# Patient Record
Sex: FEMALE | Race: WHITE
[De-identification: names, ages, dates, MRNs, and addresses within clinical notes are randomized per-mention and may not be internally consistent; named-entity substitution may affect disease eponyms.]

---

## 2020-01-03 ENCOUNTER — HOSPITAL ENCOUNTER (OUTPATIENT)
Dept: HOSPITAL 8 - STAR | Age: 80
Discharge: HOME | End: 2020-01-03
Attending: OBSTETRICS & GYNECOLOGY
Payer: MEDICARE

## 2020-01-03 DIAGNOSIS — Z90.710: ICD-10-CM

## 2020-01-03 DIAGNOSIS — Z98.890: ICD-10-CM

## 2020-01-03 DIAGNOSIS — N81.4: Primary | ICD-10-CM

## 2020-01-03 DIAGNOSIS — I10: ICD-10-CM

## 2020-01-03 DIAGNOSIS — N81.6: ICD-10-CM

## 2020-01-03 DIAGNOSIS — N39.3: ICD-10-CM

## 2020-01-03 LAB
ALBUMIN SERPL-MCNC: 3.6 G/DL (ref 3.4–5)
ALP SERPL-CCNC: 45 U/L (ref 45–117)
ALT SERPL-CCNC: 26 U/L (ref 12–78)
ANION GAP SERPL CALC-SCNC: 6 MMOL/L (ref 5–15)
BASOPHILS # BLD AUTO: 0.05 X10^3/UL (ref 0–0.1)
BASOPHILS NFR BLD AUTO: 1 % (ref 0–1)
BILIRUB SERPL-MCNC: 0.3 MG/DL (ref 0.2–1)
CALCIUM SERPL-MCNC: 9 MG/DL (ref 8.5–10.1)
CHLORIDE SERPL-SCNC: 108 MMOL/L (ref 98–107)
CREAT SERPL-MCNC: 1.19 MG/DL (ref 0.55–1.02)
CULTURE INDICATED?: YES
EOSINOPHIL # BLD AUTO: 0 X10^3/UL (ref 0–0.4)
EOSINOPHIL NFR BLD AUTO: 0 % (ref 1–7)
ERYTHROCYTE [DISTWIDTH] IN BLOOD BY AUTOMATED COUNT: 12.7 % (ref 9.6–15.2)
LYMPHOCYTES # BLD AUTO: 2.59 X10^3/UL (ref 1–3.4)
LYMPHOCYTES NFR BLD AUTO: 28 % (ref 22–44)
MCH RBC QN AUTO: 32 PG (ref 27–34.8)
MCHC RBC AUTO-ENTMCNC: 32.9 G/DL (ref 32.4–35.8)
MCV RBC AUTO: 97.3 FL (ref 80–100)
MD: NO
MICROSCOPIC: (no result)
MONOCYTES # BLD AUTO: 1.07 X10^3/UL (ref 0.2–0.8)
MONOCYTES NFR BLD AUTO: 12 % (ref 2–9)
NEUTROPHILS # BLD AUTO: 5.49 X10^3/UL (ref 1.8–6.8)
NEUTROPHILS NFR BLD AUTO: 60 % (ref 42–75)
PLATELET # BLD AUTO: 316 X10^3/UL (ref 130–400)
PMV BLD AUTO: 7.4 FL (ref 7.4–10.4)
PROT SERPL-MCNC: 7.2 G/DL (ref 6.4–8.2)
RBC # BLD AUTO: 4.12 X10^6/UL (ref 3.82–5.3)

## 2020-01-03 PROCEDURE — 93005 ELECTROCARDIOGRAM TRACING: CPT

## 2020-01-03 PROCEDURE — 36415 COLL VENOUS BLD VENIPUNCTURE: CPT

## 2020-01-03 PROCEDURE — 87077 CULTURE AEROBIC IDENTIFY: CPT

## 2020-01-03 PROCEDURE — 71046 X-RAY EXAM CHEST 2 VIEWS: CPT

## 2020-01-03 PROCEDURE — 87186 SC STD MICRODIL/AGAR DIL: CPT

## 2020-01-03 PROCEDURE — 87086 URINE CULTURE/COLONY COUNT: CPT

## 2020-01-03 PROCEDURE — 85025 COMPLETE CBC W/AUTO DIFF WBC: CPT

## 2020-01-03 PROCEDURE — 81001 URINALYSIS AUTO W/SCOPE: CPT

## 2020-01-03 PROCEDURE — 80053 COMPREHEN METABOLIC PANEL: CPT

## 2020-01-10 ENCOUNTER — HOSPITAL ENCOUNTER (OUTPATIENT)
Dept: HOSPITAL 8 - OUT | Age: 80
Setting detail: OBSERVATION
LOS: 1 days | Discharge: HOME | End: 2020-01-11
Attending: OBSTETRICS & GYNECOLOGY | Admitting: OBSTETRICS & GYNECOLOGY
Payer: MEDICARE

## 2020-01-10 VITALS — DIASTOLIC BLOOD PRESSURE: 63 MMHG | SYSTOLIC BLOOD PRESSURE: 130 MMHG

## 2020-01-10 VITALS — WEIGHT: 99.21 LBS | HEIGHT: 62 IN | BODY MASS INDEX: 18.26 KG/M2

## 2020-01-10 VITALS — SYSTOLIC BLOOD PRESSURE: 144 MMHG | DIASTOLIC BLOOD PRESSURE: 81 MMHG

## 2020-01-10 VITALS — DIASTOLIC BLOOD PRESSURE: 48 MMHG | SYSTOLIC BLOOD PRESSURE: 100 MMHG

## 2020-01-10 DIAGNOSIS — Z79.899: ICD-10-CM

## 2020-01-10 DIAGNOSIS — I10: ICD-10-CM

## 2020-01-10 DIAGNOSIS — N81.4: Primary | ICD-10-CM

## 2020-01-10 DIAGNOSIS — N39.3: ICD-10-CM

## 2020-01-10 PROCEDURE — 85014 HEMATOCRIT: CPT

## 2020-01-10 PROCEDURE — 88307 TISSUE EXAM BY PATHOLOGIST: CPT

## 2020-01-10 PROCEDURE — 57265 CMBN AP COLPRHY W/NTRCL RPR: CPT

## 2020-01-10 PROCEDURE — C1771 REP DEV, URINARY, W/SLING: HCPCS

## 2020-01-10 PROCEDURE — G0378 HOSPITAL OBSERVATION PER HR: HCPCS

## 2020-01-10 PROCEDURE — C1760 CLOSURE DEV, VASC: HCPCS

## 2020-01-10 PROCEDURE — 51990 LAPARO URETHRAL SUSPENSION: CPT

## 2020-01-10 PROCEDURE — 36415 COLL VENOUS BLD VENIPUNCTURE: CPT

## 2020-01-10 PROCEDURE — 58550 LAPARO-ASST VAG HYSTERECTOMY: CPT

## 2020-01-10 RX ADMIN — LABETALOL HYDROCHLORIDE PRN MG: 5 INJECTION, SOLUTION INTRAVENOUS at 14:45

## 2020-01-10 RX ADMIN — LABETALOL HYDROCHLORIDE PRN MG: 5 INJECTION, SOLUTION INTRAVENOUS at 14:35

## 2020-01-10 RX ADMIN — SODIUM CHLORIDE, SODIUM LACTATE, POTASSIUM CHLORIDE, AND CALCIUM CHLORIDE SCH MLS/HR: .6; .31; .03; .02 INJECTION, SOLUTION INTRAVENOUS at 22:19

## 2020-01-10 RX ADMIN — PROMETHAZINE HYDROCHLORIDE PRN MG: 25 INJECTION INTRAMUSCULAR; INTRAVENOUS at 15:20

## 2020-01-10 RX ADMIN — PROMETHAZINE HYDROCHLORIDE PRN MG: 25 INJECTION INTRAMUSCULAR; INTRAVENOUS at 15:30

## 2020-01-10 RX ADMIN — SODIUM CHLORIDE, SODIUM LACTATE, POTASSIUM CHLORIDE, AND CALCIUM CHLORIDE SCH MLS/HR: .6; .31; .03; .02 INJECTION, SOLUTION INTRAVENOUS at 17:00

## 2020-01-11 VITALS — SYSTOLIC BLOOD PRESSURE: 107 MMHG | DIASTOLIC BLOOD PRESSURE: 59 MMHG

## 2020-01-11 VITALS — DIASTOLIC BLOOD PRESSURE: 58 MMHG | SYSTOLIC BLOOD PRESSURE: 123 MMHG

## 2020-01-11 VITALS — DIASTOLIC BLOOD PRESSURE: 46 MMHG | SYSTOLIC BLOOD PRESSURE: 102 MMHG

## 2020-01-11 VITALS — SYSTOLIC BLOOD PRESSURE: 90 MMHG | DIASTOLIC BLOOD PRESSURE: 48 MMHG

## 2020-01-11 RX ADMIN — SODIUM CHLORIDE, SODIUM LACTATE, POTASSIUM CHLORIDE, AND CALCIUM CHLORIDE SCH MLS/HR: .6; .31; .03; .02 INJECTION, SOLUTION INTRAVENOUS at 05:46

## 2021-01-14 DIAGNOSIS — Z23 NEED FOR VACCINATION: ICD-10-CM

## 2022-02-22 ENCOUNTER — HOSPITAL ENCOUNTER (INPATIENT)
Facility: MEDICAL CENTER | Age: 82
LOS: 3 days | DRG: 175 | End: 2022-02-25
Attending: EMERGENCY MEDICINE | Admitting: INTERNAL MEDICINE
Payer: MEDICARE

## 2022-02-22 ENCOUNTER — APPOINTMENT (OUTPATIENT)
Dept: RADIOLOGY | Facility: MEDICAL CENTER | Age: 82
DRG: 175 | End: 2022-02-22
Attending: EMERGENCY MEDICINE
Payer: MEDICARE

## 2022-02-22 ENCOUNTER — APPOINTMENT (OUTPATIENT)
Dept: RADIOLOGY | Facility: MEDICAL CENTER | Age: 82
DRG: 175 | End: 2022-02-22
Attending: STUDENT IN AN ORGANIZED HEALTH CARE EDUCATION/TRAINING PROGRAM
Payer: MEDICARE

## 2022-02-22 ENCOUNTER — APPOINTMENT (OUTPATIENT)
Dept: CARDIOLOGY | Facility: MEDICAL CENTER | Age: 82
DRG: 175 | End: 2022-02-22
Attending: STUDENT IN AN ORGANIZED HEALTH CARE EDUCATION/TRAINING PROGRAM
Payer: MEDICARE

## 2022-02-22 DIAGNOSIS — E43 SEVERE PROTEIN-CALORIE MALNUTRITION (HCC): ICD-10-CM

## 2022-02-22 DIAGNOSIS — T45.515A ADVERSE EFFECT OF ANTICOAGULANT, INITIAL ENCOUNTER: ICD-10-CM

## 2022-02-22 DIAGNOSIS — R09.02 HYPOXIA: ICD-10-CM

## 2022-02-22 PROBLEM — E46 MALNUTRITION (HCC): Status: ACTIVE | Noted: 2022-02-22

## 2022-02-22 PROBLEM — R10.9 AP (ABDOMINAL PAIN): Status: ACTIVE | Noted: 2022-02-22

## 2022-02-22 PROBLEM — I26.99 PULMONARY EMBOLISM (HCC): Status: ACTIVE | Noted: 2022-02-22

## 2022-02-22 PROBLEM — R79.89 ELEVATED TROPONIN: Status: ACTIVE | Noted: 2022-02-22

## 2022-02-22 PROBLEM — N17.9 ACUTE KIDNEY FAILURE (HCC): Status: ACTIVE | Noted: 2022-02-22

## 2022-02-22 PROBLEM — C80.1 CANCER (HCC): Status: ACTIVE | Noted: 2022-02-22

## 2022-02-22 PROBLEM — E87.20 METABOLIC ACIDOSIS WITH NORMAL ANION GAP AND BICARBONATE LOSSES: Status: ACTIVE | Noted: 2022-02-22

## 2022-02-22 PROBLEM — J96.01 ACUTE RESPIRATORY FAILURE WITH HYPOXIA (HCC): Status: ACTIVE | Noted: 2022-02-22

## 2022-02-22 LAB
25(OH)D3 SERPL-MCNC: 29 NG/ML (ref 30–100)
ACETONE UR QL: ABNORMAL
ALBUMIN SERPL BCP-MCNC: 3.9 G/DL (ref 3.2–4.9)
ALBUMIN/GLOB SERPL: 1.3 G/DL
ALP SERPL-CCNC: 59 U/L (ref 30–99)
ALT SERPL-CCNC: 5 U/L (ref 2–50)
ANION GAP SERPL CALC-SCNC: 11 MMOL/L (ref 7–16)
APPEARANCE UR: ABNORMAL
AST SERPL-CCNC: 11 U/L (ref 12–45)
BACTERIA #/AREA URNS HPF: NEGATIVE /HPF
BASE EXCESS BLDA CALC-SCNC: -11 MMOL/L (ref -4–3)
BASOPHILS # BLD AUTO: 0.2 % (ref 0–1.8)
BASOPHILS # BLD: 0.03 K/UL (ref 0–0.12)
BILIRUB SERPL-MCNC: 0.3 MG/DL (ref 0.1–1.5)
BILIRUB UR QL STRIP.AUTO: ABNORMAL
BODY TEMPERATURE: ABNORMAL CENTIGRADE
BUN SERPL-MCNC: 25 MG/DL (ref 8–22)
CALCIUM SERPL-MCNC: 9.9 MG/DL (ref 8.5–10.5)
CHLORIDE SERPL-SCNC: 108 MMOL/L (ref 96–112)
CHLORIDE UR-SCNC: 100 MMOL/L
CO2 SERPL-SCNC: 13 MMOL/L (ref 20–33)
COLOR UR: ABNORMAL
CREAT SERPL-MCNC: 1.46 MG/DL (ref 0.5–1.4)
CREAT UR-MCNC: 230 MG/DL
EKG IMPRESSION: NORMAL
EOSINOPHIL # BLD AUTO: 0 K/UL (ref 0–0.51)
EOSINOPHIL NFR BLD: 0 % (ref 0–6.9)
EPI CELLS #/AREA URNS HPF: ABNORMAL /HPF
ERYTHROCYTE [DISTWIDTH] IN BLOOD BY AUTOMATED COUNT: 57.1 FL (ref 35.9–50)
FLUAV RNA SPEC QL NAA+PROBE: NEGATIVE
FLUBV RNA SPEC QL NAA+PROBE: NEGATIVE
GLOBULIN SER CALC-MCNC: 3.1 G/DL (ref 1.9–3.5)
GLUCOSE SERPL-MCNC: 117 MG/DL (ref 65–99)
GLUCOSE UR STRIP.AUTO-MCNC: NEGATIVE MG/DL
GRAN CASTS #/AREA URNS LPF: ABNORMAL /LPF
HCO3 BLDA-SCNC: 13 MMOL/L (ref 17–25)
HCT VFR BLD AUTO: 33.5 % (ref 37–47)
HEMOCCULT SP1 STL QL: NEGATIVE
HGB BLD-MCNC: 10.8 G/DL (ref 12–16)
HYALINE CASTS #/AREA URNS LPF: ABNORMAL /LPF
IMM GRANULOCYTES # BLD AUTO: 0.07 K/UL (ref 0–0.11)
IMM GRANULOCYTES NFR BLD AUTO: 0.5 % (ref 0–0.9)
KETONES UR STRIP.AUTO-MCNC: ABNORMAL MG/DL
LACTATE BLD-SCNC: 1 MMOL/L (ref 0.5–2)
LEUKOCYTE ESTERASE UR QL STRIP.AUTO: ABNORMAL
LYMPHOCYTES # BLD AUTO: 1.65 K/UL (ref 1–4.8)
LYMPHOCYTES NFR BLD: 12.9 % (ref 22–41)
MAGNESIUM SERPL-MCNC: 1.7 MG/DL (ref 1.5–2.5)
MCH RBC QN AUTO: 29.8 PG (ref 27–33)
MCHC RBC AUTO-ENTMCNC: 32.2 G/DL (ref 33.6–35)
MCV RBC AUTO: 92.3 FL (ref 81.4–97.8)
MICRO URNS: ABNORMAL
MONOCYTES # BLD AUTO: 1.35 K/UL (ref 0–0.85)
MONOCYTES NFR BLD AUTO: 10.5 % (ref 0–13.4)
NEUTROPHILS # BLD AUTO: 9.71 K/UL (ref 2–7.15)
NEUTROPHILS NFR BLD: 75.9 % (ref 44–72)
NITRITE UR QL STRIP.AUTO: NEGATIVE
NRBC # BLD AUTO: 0 K/UL
NRBC BLD-RTO: 0 /100 WBC
NT-PROBNP SERPL IA-MCNC: 741 PG/ML (ref 0–125)
OSMOLALITY UR: 608 MOSM/KG H2O (ref 300–900)
PCO2 BLDA: 25.6 MMHG (ref 26–37)
PH BLDA: 7.33 [PH] (ref 7.4–7.5)
PH UR STRIP.AUTO: 5 [PH] (ref 5–8)
PHOSPHATE SERPL-MCNC: 3.2 MG/DL (ref 2.5–4.5)
PLATELET # BLD AUTO: 469 K/UL (ref 164–446)
PMV BLD AUTO: 8.2 FL (ref 9–12.9)
PO2 BLDA: 206.4 MMHG (ref 64–87)
POTASSIUM SERPL-SCNC: 5 MMOL/L (ref 3.6–5.5)
POTASSIUM UR-SCNC: 75 MMOL/L
PREALB SERPL-MCNC: 27.9 MG/DL (ref 18–38)
PROCALCITONIN SERPL-MCNC: 0.16 NG/ML
PROT SERPL-MCNC: 7 G/DL (ref 6–8.2)
PROT UR QL STRIP: 30 MG/DL
PROT UR-MCNC: 68 MG/DL (ref 0–15)
RBC # BLD AUTO: 3.63 M/UL (ref 4.2–5.4)
RBC # URNS HPF: ABNORMAL /HPF
RBC UR QL AUTO: ABNORMAL
RSV RNA SPEC QL NAA+PROBE: NEGATIVE
SAO2 % BLDA: 99.3 % (ref 93–99)
SARS-COV-2 RNA RESP QL NAA+PROBE: NOTDETECTED
SODIUM SERPL-SCNC: 132 MMOL/L (ref 135–145)
SODIUM UR-SCNC: 30 MMOL/L
SP GR UR STRIP.AUTO: 1.03
SPECIMEN SOURCE: NORMAL
TROPONIN T SERPL-MCNC: 21 NG/L (ref 6–19)
TROPONIN T SERPL-MCNC: 23 NG/L (ref 6–19)
TSH SERPL DL<=0.005 MIU/L-ACNC: 1.99 UIU/ML (ref 0.38–5.33)
UROBILINOGEN UR STRIP.AUTO-MCNC: 0.2 MG/DL
VIT B12 SERPL-MCNC: 634 PG/ML (ref 211–911)
WBC # BLD AUTO: 12.8 K/UL (ref 4.8–10.8)
WBC #/AREA URNS HPF: ABNORMAL /HPF
YEAST HYPHAE #/AREA URNS HPF: PRESENT /HPF

## 2022-02-22 PROCEDURE — 93005 ELECTROCARDIOGRAM TRACING: CPT

## 2022-02-22 PROCEDURE — 83880 ASSAY OF NATRIURETIC PEPTIDE: CPT

## 2022-02-22 PROCEDURE — 87493 C DIFF AMPLIFIED PROBE: CPT

## 2022-02-22 PROCEDURE — 700105 HCHG RX REV CODE 258: Performed by: STUDENT IN AN ORGANIZED HEALTH CARE EDUCATION/TRAINING PROGRAM

## 2022-02-22 PROCEDURE — 71275 CT ANGIOGRAPHY CHEST: CPT | Mod: ME

## 2022-02-22 PROCEDURE — 84156 ASSAY OF PROTEIN URINE: CPT

## 2022-02-22 PROCEDURE — 81002 URINALYSIS NONAUTO W/O SCOPE: CPT

## 2022-02-22 PROCEDURE — A9270 NON-COVERED ITEM OR SERVICE: HCPCS | Performed by: INTERNAL MEDICINE

## 2022-02-22 PROCEDURE — 83935 ASSAY OF URINE OSMOLALITY: CPT

## 2022-02-22 PROCEDURE — 82607 VITAMIN B-12: CPT

## 2022-02-22 PROCEDURE — 83630 LACTOFERRIN FECAL (QUAL): CPT

## 2022-02-22 PROCEDURE — 84134 ASSAY OF PREALBUMIN: CPT

## 2022-02-22 PROCEDURE — 0241U HCHG SARS-COV-2 COVID-19 NFCT DS RESP RNA 4 TRGT MIC: CPT

## 2022-02-22 PROCEDURE — 82103 ALPHA-1-ANTITRYPSIN TOTAL: CPT

## 2022-02-22 PROCEDURE — 74177 CT ABD & PELVIS W/CONTRAST: CPT | Mod: ME

## 2022-02-22 PROCEDURE — 99223 1ST HOSP IP/OBS HIGH 75: CPT | Performed by: INTERNAL MEDICINE

## 2022-02-22 PROCEDURE — 84145 PROCALCITONIN (PCT): CPT

## 2022-02-22 PROCEDURE — 93005 ELECTROCARDIOGRAM TRACING: CPT | Performed by: EMERGENCY MEDICINE

## 2022-02-22 PROCEDURE — 82803 BLOOD GASES ANY COMBINATION: CPT

## 2022-02-22 PROCEDURE — 82306 VITAMIN D 25 HYDROXY: CPT

## 2022-02-22 PROCEDURE — 84300 ASSAY OF URINE SODIUM: CPT

## 2022-02-22 PROCEDURE — 87040 BLOOD CULTURE FOR BACTERIA: CPT | Mod: 91

## 2022-02-22 PROCEDURE — 82570 ASSAY OF URINE CREATININE: CPT

## 2022-02-22 PROCEDURE — 82436 ASSAY OF URINE CHLORIDE: CPT

## 2022-02-22 PROCEDURE — 700117 HCHG RX CONTRAST REV CODE 255: Performed by: STUDENT IN AN ORGANIZED HEALTH CARE EDUCATION/TRAINING PROGRAM

## 2022-02-22 PROCEDURE — 700102 HCHG RX REV CODE 250 W/ 637 OVERRIDE(OP): Performed by: STUDENT IN AN ORGANIZED HEALTH CARE EDUCATION/TRAINING PROGRAM

## 2022-02-22 PROCEDURE — 36415 COLL VENOUS BLD VENIPUNCTURE: CPT

## 2022-02-22 PROCEDURE — 83735 ASSAY OF MAGNESIUM: CPT

## 2022-02-22 PROCEDURE — 71045 X-RAY EXAM CHEST 1 VIEW: CPT

## 2022-02-22 PROCEDURE — C9803 HOPD COVID-19 SPEC COLLECT: HCPCS | Performed by: STUDENT IN AN ORGANIZED HEALTH CARE EDUCATION/TRAINING PROGRAM

## 2022-02-22 PROCEDURE — 99285 EMERGENCY DEPT VISIT HI MDM: CPT

## 2022-02-22 PROCEDURE — 80053 COMPREHEN METABOLIC PANEL: CPT

## 2022-02-22 PROCEDURE — A9270 NON-COVERED ITEM OR SERVICE: HCPCS | Performed by: STUDENT IN AN ORGANIZED HEALTH CARE EDUCATION/TRAINING PROGRAM

## 2022-02-22 PROCEDURE — 84443 ASSAY THYROID STIM HORMONE: CPT

## 2022-02-22 PROCEDURE — 85025 COMPLETE CBC W/AUTO DIFF WBC: CPT

## 2022-02-22 PROCEDURE — 84484 ASSAY OF TROPONIN QUANT: CPT

## 2022-02-22 PROCEDURE — 84100 ASSAY OF PHOSPHORUS: CPT

## 2022-02-22 PROCEDURE — 770020 HCHG ROOM/CARE - TELE (206)

## 2022-02-22 PROCEDURE — 81001 URINALYSIS AUTO W/SCOPE: CPT

## 2022-02-22 PROCEDURE — 82270 OCCULT BLOOD FECES: CPT

## 2022-02-22 PROCEDURE — 700102 HCHG RX REV CODE 250 W/ 637 OVERRIDE(OP): Performed by: INTERNAL MEDICINE

## 2022-02-22 PROCEDURE — 84133 ASSAY OF URINE POTASSIUM: CPT

## 2022-02-22 PROCEDURE — 83605 ASSAY OF LACTIC ACID: CPT

## 2022-02-22 RX ORDER — LABETALOL HYDROCHLORIDE 5 MG/ML
10 INJECTION, SOLUTION INTRAVENOUS EVERY 4 HOURS PRN
Status: DISCONTINUED | OUTPATIENT
Start: 2022-02-22 | End: 2022-02-25 | Stop reason: HOSPADM

## 2022-02-22 RX ORDER — RALOXIFENE HYDROCHLORIDE 60 MG/1
60 TABLET, FILM COATED ORAL DAILY
Status: DISCONTINUED | OUTPATIENT
Start: 2022-02-23 | End: 2022-02-25 | Stop reason: HOSPADM

## 2022-02-22 RX ORDER — BISACODYL 10 MG
10 SUPPOSITORY, RECTAL RECTAL
Status: DISCONTINUED | OUTPATIENT
Start: 2022-02-22 | End: 2022-02-22

## 2022-02-22 RX ORDER — ACETAMINOPHEN 325 MG/1
650 TABLET ORAL EVERY 6 HOURS PRN
Status: DISCONTINUED | OUTPATIENT
Start: 2022-02-22 | End: 2022-02-25 | Stop reason: HOSPADM

## 2022-02-22 RX ORDER — AMOXICILLIN 250 MG
2 CAPSULE ORAL 2 TIMES DAILY
Status: DISCONTINUED | OUTPATIENT
Start: 2022-02-22 | End: 2022-02-22

## 2022-02-22 RX ORDER — RALOXIFENE HYDROCHLORIDE 60 MG/1
60 TABLET, FILM COATED ORAL DAILY
COMMUNITY

## 2022-02-22 RX ORDER — AMOXICILLIN AND CLAVULANATE POTASSIUM 875; 125 MG/1; MG/1
1 TABLET, FILM COATED ORAL 2 TIMES DAILY
Status: ON HOLD | COMMUNITY
End: 2022-02-25

## 2022-02-22 RX ORDER — LOVASTATIN 20 MG/1
20 TABLET ORAL DAILY
Status: DISCONTINUED | OUTPATIENT
Start: 2022-02-23 | End: 2022-02-25 | Stop reason: HOSPADM

## 2022-02-22 RX ORDER — ONDANSETRON 4 MG/1
4 TABLET, ORALLY DISINTEGRATING ORAL EVERY 6 HOURS PRN
COMMUNITY

## 2022-02-22 RX ORDER — VITAMIN B COMPLEX
1000 TABLET ORAL DAILY
COMMUNITY

## 2022-02-22 RX ORDER — POLYETHYLENE GLYCOL 3350 17 G/17G
1 POWDER, FOR SOLUTION ORAL
Status: DISCONTINUED | OUTPATIENT
Start: 2022-02-22 | End: 2022-02-22

## 2022-02-22 RX ORDER — LOVASTATIN 20 MG/1
20 TABLET ORAL DAILY
COMMUNITY

## 2022-02-22 RX ORDER — SODIUM CHLORIDE, SODIUM LACTATE, POTASSIUM CHLORIDE, CALCIUM CHLORIDE 600; 310; 30; 20 MG/100ML; MG/100ML; MG/100ML; MG/100ML
INJECTION, SOLUTION INTRAVENOUS CONTINUOUS
Status: DISCONTINUED | OUTPATIENT
Start: 2022-02-22 | End: 2022-02-25 | Stop reason: HOSPADM

## 2022-02-22 RX ORDER — SENNOSIDES 8.6 MG
1300 CAPSULE ORAL 3 TIMES DAILY PRN
COMMUNITY

## 2022-02-22 RX ORDER — OMEPRAZOLE 20 MG/1
20 CAPSULE, DELAYED RELEASE ORAL DAILY
Status: DISCONTINUED | OUTPATIENT
Start: 2022-02-23 | End: 2022-02-25 | Stop reason: HOSPADM

## 2022-02-22 RX ORDER — SODIUM CHLORIDE, SODIUM LACTATE, POTASSIUM CHLORIDE, AND CALCIUM CHLORIDE .6; .31; .03; .02 G/100ML; G/100ML; G/100ML; G/100ML
1000 INJECTION, SOLUTION INTRAVENOUS ONCE
Status: COMPLETED | OUTPATIENT
Start: 2022-02-22 | End: 2022-02-22

## 2022-02-22 RX ORDER — OMEPRAZOLE 20 MG/1
20 CAPSULE, DELAYED RELEASE ORAL DAILY
COMMUNITY

## 2022-02-22 RX ORDER — SODIUM CHLORIDE 9 MG/ML
INJECTION, SOLUTION INTRAVENOUS CONTINUOUS
Status: DISCONTINUED | OUTPATIENT
Start: 2022-02-22 | End: 2022-02-22

## 2022-02-22 RX ORDER — VITAMIN B COMPLEX
1000 TABLET ORAL DAILY
Status: DISCONTINUED | OUTPATIENT
Start: 2022-02-23 | End: 2022-02-25 | Stop reason: HOSPADM

## 2022-02-22 RX ADMIN — SODIUM CHLORIDE, POTASSIUM CHLORIDE, SODIUM LACTATE AND CALCIUM CHLORIDE: 600; 310; 30; 20 INJECTION, SOLUTION INTRAVENOUS at 20:56

## 2022-02-22 RX ADMIN — VANCOMYCIN HYDROCHLORIDE 125 MG: KIT ORAL at 20:50

## 2022-02-22 RX ADMIN — METOPROLOL TARTRATE 25 MG: 25 TABLET, FILM COATED ORAL at 18:34

## 2022-02-22 RX ADMIN — IOHEXOL 65 ML: 350 INJECTION, SOLUTION INTRAVENOUS at 20:25

## 2022-02-22 RX ADMIN — SODIUM CHLORIDE, POTASSIUM CHLORIDE, SODIUM LACTATE AND CALCIUM CHLORIDE 1000 ML: 600; 310; 30; 20 INJECTION, SOLUTION INTRAVENOUS at 18:42

## 2022-02-22 RX ADMIN — RIVAROXABAN 15 MG: 15 TABLET, FILM COATED ORAL at 18:34

## 2022-02-22 ASSESSMENT — ENCOUNTER SYMPTOMS
DIARRHEA: 0
WEIGHT LOSS: 1
ABDOMINAL PAIN: 1
SORE THROAT: 0
NERVOUS/ANXIOUS: 1
NAUSEA: 0
COUGH: 0
CONSTIPATION: 0
HEADACHES: 0
BLOOD IN STOOL: 0
CHILLS: 0
DEPRESSION: 0
FEVER: 0
DIZZINESS: 0
HEARTBURN: 0
SHORTNESS OF BREATH: 1
PALPITATIONS: 0
VOMITING: 0
INSOMNIA: 1
WHEEZING: 0

## 2022-02-22 ASSESSMENT — LIFESTYLE VARIABLES
AVERAGE NUMBER OF DAYS PER WEEK YOU HAVE A DRINK CONTAINING ALCOHOL: 0
ALCOHOL_USE: NO
ON A TYPICAL DAY WHEN YOU DRINK ALCOHOL HOW MANY DRINKS DO YOU HAVE: 0
HAVE YOU EVER FELT YOU SHOULD CUT DOWN ON YOUR DRINKING: NO
CONSUMPTION TOTAL: NEGATIVE
TOTAL SCORE: 0
EVER FELT BAD OR GUILTY ABOUT YOUR DRINKING: NO
EVER HAD A DRINK FIRST THING IN THE MORNING TO STEADY YOUR NERVES TO GET RID OF A HANGOVER: NO
DOES PATIENT WANT TO STOP DRINKING: NO
HAVE PEOPLE ANNOYED YOU BY CRITICIZING YOUR DRINKING: NO
TOTAL SCORE: 0
HOW MANY TIMES IN THE PAST YEAR HAVE YOU HAD 5 OR MORE DRINKS IN A DAY: 0
TOTAL SCORE: 0

## 2022-02-22 ASSESSMENT — PATIENT HEALTH QUESTIONNAIRE - PHQ9
1. LITTLE INTEREST OR PLEASURE IN DOING THINGS: NOT AT ALL
2. FEELING DOWN, DEPRESSED, IRRITABLE, OR HOPELESS: NOT AT ALL
SUM OF ALL RESPONSES TO PHQ9 QUESTIONS 1 AND 2: 0

## 2022-02-22 ASSESSMENT — FIBROSIS 4 INDEX: FIB4 SCORE: 0.87

## 2022-02-22 ASSESSMENT — PAIN DESCRIPTION - PAIN TYPE: TYPE: ACUTE PAIN

## 2022-02-22 NOTE — ED NOTES
Med rec completed per pt and son  Allergies reviewed    Pt completed a 5 day course of Augmentin this morning     Pt started taking Xarelto 15 mg twice daily about 3 weeks ago

## 2022-02-22 NOTE — ED PROVIDER NOTES
"ED Provider Note    CHIEF COMPLAINT  Chief Complaint   Patient presents with   • Shortness of Breath     Pt BIB EMS from cancer clinic. Pt's O2 sat in 70's on RA per EMS. Pt placed on 6L O2. Pt A&Ox4.       HPI  Vira Carmona is a 82 y.o. female who presents to the clinic with shortness of breath.  The patient was really tired and exhausted after going to her appointment at the gynecology oncology office.  This is a precipitous change for the worse.  She is really wiped out, short of breath.  The patient is status post surgery, sounds like nephrectomy, bowel resection placement of colostomy a month ago.  Complicate afterwards by pulmonary embolism.  She is been maintained on Xarelto, last dose this morning.  She had no cough or cold symptoms.  No fever chills.  Although she is cold, she has no change here.  No change in bladder.  No leg pain or swelling.  She just feels generally awful.  No chest pain.  There is no other complaint.    PAST MEDICAL HISTORY  As above.  Pulmonary embolism.    FAMILY HISTORY  History reviewed. No pertinent family history.    SOCIAL HISTORY  Social History     Tobacco Use   • Smoking status: Never Smoker   • Smokeless tobacco: Never Used   Vaping Use   • Vaping Use: Never used   Substance Use Topics   • Alcohol use: Never   • Drug use: Never   Here with her son who augments the history    SURGICAL HISTORY  History reviewed. No pertinent surgical history.    CURRENT MEDICATIONS    I have reviewed the nurses notes and/or the list brought with the patient.    ALLERGIES  Allergies   Allergen Reactions   • Ciprofloxacin        REVIEW OF SYSTEMS  See HPI for further details. Review of systems as above, otherwise all other systems are negative.     PHYSICAL EXAM  VITAL SIGNS: /81   Pulse 94   Temp 36.2 °C (97.2 °F) (Temporal)   Resp 18   Ht 1.575 m (5' 2\")   Wt 35.4 kg (78 lb)   SpO2 91%   BMI 14.27 kg/m²     Constitutional: Frail, dyspneic.  Although she appears chronically " ill not toxic however.  HENT: Mucus membranes moist.  Oropharynx is clear.  Eyes: Pupils equally round.  No scleral icterus.   Neck: Full nontender range of motion.  Lymphatic: No cervical lymphadenopathy noted.   Cardiovascular: Regular heart rate and rhythm.  No murmurs, rubs, nor gallop appreciated.   Thorax & Lungs: Chest is nontender.  Quite tachypneic.  Lungs are clear to auscultation with good air movement bilaterally.  No wheeze, rhonchi, nor rales.   Abdomen: Soft, with no tenderness, rebound nor guarding.  No mass, pulsatile mass, nor hepatosplenomegaly appreciated.  Skin: No purpura nor petechia noted.  Extremities/Musculoskeletal: No sign of trauma.  Calves are nontender with no cords nor edema.  No Jett's sign.  Pulses are intact all around.   Neurologic: Alert & oriented.  Strength and sensation is intact all around.   Psychiatric: Normal affect appropriate for the clinical situation.    EKG  I interpreted this EKG myself.  This is a 12-lead study.  The rhythm is sinus with a normal rate.  There are no ST segment nor T wave abnormalities.  Interpretation: No ST segment elevation myocardial infarction.    LABS  Labs Reviewed   CBC WITH DIFFERENTIAL - Abnormal; Notable for the following components:       Result Value    WBC 12.8 (*)     RBC 3.63 (*)     Hemoglobin 10.8 (*)     Hematocrit 33.5 (*)     MCHC 32.2 (*)     RDW 57.1 (*)     Platelet Count 469 (*)     MPV 8.2 (*)     Neutrophils-Polys 75.90 (*)     Lymphocytes 12.90 (*)     Neutrophils (Absolute) 9.71 (*)     Monos (Absolute) 1.35 (*)     All other components within normal limits   PROBRAIN NATRIURETIC PEPTIDE, NT - Abnormal; Notable for the following components:    NT-proBNP 741 (*)     All other components within normal limits   TROPONIN - Abnormal; Notable for the following components:    Troponin T 23 (*)     All other components within normal limits   COMP METABOLIC PANEL - Abnormal; Notable for the following components:    Sodium 132 (*)  "    Co2 13 (*)     Glucose 117 (*)     Bun 25 (*)     Creatinine 1.46 (*)     AST(SGOT) 11 (*)     All other components within normal limits   ESTIMATED GFR - Abnormal; Notable for the following components:    GFR If  41 (*)     GFR If Non  34 (*)     All other components within normal limits   PROCALCITONIN   BLOOD CULTURE    Narrative:     Per Hospital Policy: Only change Specimen Src: to \"Line\" if  specified by physician order.   BLOOD CULTURE    Narrative:     Per Hospital Policy: Only change Specimen Src: to \"Line\" if  specified by physician order.   ARTERIAL BLOOD GAS         RADIOLOGY/PROCEDURES  I have reviewed the patient's film interpretations myself, and they are read out by the radiologist as:   DX-CHEST-PORTABLE (1 VIEW)   Final Result      1.  There is no acute cardiopulmonary process.   2.  Lungs are hyperinflated suggestive of COPD or emphysematous change.        .    MEDICAL RECORD  I have reviewed patient's medical record and pertinent results are listed above.    COURSE & MEDICAL DECISION MAKING  I have reviewed any medical record information, laboratory studies and radiographic results as noted above.  This patient presents with increasing shortness of breath acutely over the course of the day.  She is already anticoagulated for pulmonary embolism.  Saturations here initially were hard to  due to her cool extremities, however, she is certainly short of breath objectively.  Our saturations did improve with supplemental oxygen as we were able to get them.  X-ray shows no evidence of an infiltrate.  I do note her mild leukocytosis however.  BNP is elevated at 741, suggesting some cardiac involvement.  I discussed the patient's case with the nurse practitioner from the gynecology oncology service, who has seen the patient.  They would like the patient mated to medicine for cardiac work-up and stabilization.  They will follow along.  Case discussed with the " renown hospitalist.  She is admitted.    FINAL IMPRESSION  1. Hypoxia    2. Adverse effect of anticoagulant, initial encounter           This dictation was created using voice recognition software.    Electronically signed by: Abram Angel M.D., 2/22/2022 11:54 AM

## 2022-02-22 NOTE — ED TRIAGE NOTES
"Chief Complaint   Patient presents with   • Shortness of Breath     Pt BIB EMS from cancer clinic. Pt's O2 sat in 70's on RA per EMS. Pt placed on 6L O2. Pt A&Ox4.   Hx of PE in January.    /81   Pulse 94   Temp 36.2 °C (97.2 °F) (Temporal)   Resp 18   Ht 1.575 m (5' 2\")   Wt 35.4 kg (78 lb)   SpO2 91%   BMI 14.27 kg/m²     "

## 2022-02-22 NOTE — H&P
History & Physical Note    Date of Admission: 2/22/2022  Admission Status: Inpatient  [facility] Team: [facility]  Attending: Fabio Cuenca M.D.   Senior Resident: Dr. Mahogany Gould  Intern: Dr. Martinez  Contact Number: [phone]    Chief Complaint: Sent by MD for worsening O2 demand     History of Present Illness (HPI):   Vira is a 82 y.o. female who presented 2/22/2022 with PMH of HTN, osteopenia, ovarian/colon cancer, PE, who presented to our facility at the request of her OP oncologist due to worsening O2 demands. Patient states that she was diagnosed w/ PE earlier this month shortly after undergoing colostomy 2/2 colon cancer. Patient reports after this event she was requiring 3L O2 w/ exertion, however states she was not utilizing her O2 at all. Patient was d/c on xarelto and does report compliance of this medication. Patient reports she's been experiencing TORREZ (can't walk more than ~50 yards w/o needing to rest) and general fatigue over the past few weeks. Patient states she's also lost her appetite as well for the most part, and has noticed increased output of her ostomy (denies hematochezia). Patient denies any cough, CP, n/v, f/c. Patient also reports some recent abdominal pain for which she was given a 1 week course of Augmentin that she completed earlier today.    In the ED patient was noted to have HR 90s with WBC 12.8, Na 132, trop 23 . Procal was wnl and CXR revealed no acute findings, however patient is requiring 4-6L O2 at this time.      Review of Systems:   Review of Systems   Constitutional: Positive for malaise/fatigue and weight loss. Negative for chills and fever.   HENT: Negative for hearing loss and sore throat.    Respiratory: Positive for shortness of breath. Negative for cough and wheezing.    Cardiovascular: Negative for chest pain and palpitations.   Gastrointestinal: Positive for abdominal pain. Negative for blood in stool, constipation, diarrhea, heartburn, nausea and  vomiting.   Skin: Negative for itching and rash.   Neurological: Negative for dizziness and headaches.   Psychiatric/Behavioral: Negative for depression. The patient is nervous/anxious and has insomnia.        .  Past Medical History:   Past Medical History was reviewed with patient.   has no past medical history on file.    Past Surgical History: Past Surgical History was reviewed with patient.   has no past surgical history on file.    Medications: Medications have been reviewed with patient.  Prior to Admission Medications   Prescriptions Last Dose Informant Patient Reported? Taking?   CALCIUM PO 2/22/2022 at AM Family Member Yes Yes   Sig: Take 1 Tablet by mouth every day.   acetaminophen (TYLENOL) 650 MG CR tablet 2/22/2022 at AM Family Member Yes Yes   Sig: Take 1,300 mg by mouth 3 times a day as needed. Indications: Pain   amoxicillin-clavulanate (AUGMENTIN) 875-125 MG Tab 2/22/2022 at COMPLETED Family Member Yes Yes   Sig: Take 1 Tablet by mouth 2 times a day. 5 day course   lovastatin (MEVACOR) 20 MG Tab 2/22/2022 at AM Family Member Yes Yes   Sig: Take 20 mg by mouth every day.   metoprolol tartrate (LOPRESSOR) 25 MG Tab 2/22/2022 at AM Family Member Yes Yes   Sig: Take 25 mg by mouth 2 times a day.   multivitamin (THERAGRAN) Tab 2/22/2022 at AM Family Member Yes Yes   Sig: Take 1 Tablet by mouth every day.   omeprazole (PRILOSEC) 20 MG delayed-release capsule 2/22/2022 at AM Family Member Yes Yes   Sig: Take 20 mg by mouth every day.   ondansetron (ZOFRAN ODT) 4 MG TABLET DISPERSIBLE 2/21/2022 at UNK Family Member Yes Yes   Sig: Take 4 mg by mouth every 6 hours as needed for Nausea.   raloxifene (EVISTA) 60 MG Tab 2/22/2022 at AM Family Member Yes Yes   Sig: Take 60 mg by mouth every day.   rivaroxaban (XARELTO) 15 MG Tab tablet 2/22/2022 at AM Family Member Yes Yes   Sig: Take 15 mg by mouth 2 times a day.   vitamin D3 (CHOLECALCIFEROL) 1000 Unit (25 mcg) Tab 2/22/2022 at AM Family Member Yes Yes   Sig:  Take 1,000 Units by mouth every day.      Facility-Administered Medications: None        Allergies: Allergies have been reviewed with patient.  Allergies   Allergen Reactions   • Ciprofloxacin Rash     Rash       Family History:   family history is not on file.     Social History:   Tobacco: Denies  Alcohol: Denies   Recreational drugs (illegal and prescription):  Denies   Employment: Retired  Activity Level: Independent w/ ADL   Living situation:  Lives w/ son in [place]  Recent travel:  Denies  Primary Care Provider: reviewed No primary care provider on file.  Other (stressors, spirituality, exposures):  Denies  Physical Exam:   Vitals:  Temp:  [36.2 °C (97.2 °F)] 36.2 °C (97.2 °F)  Pulse:  [85-97] 97  Resp:  [13-21] 18  BP: (125-156)/(63-81) 125/66  SpO2:  [91 %-100 %] 95 %    Physical Exam  Constitutional:       Comments: Cachetic appearance   HENT:      Head: Normocephalic and atraumatic.      Mouth/Throat:      Mouth: Mucous membranes are dry.      Pharynx: Oropharynx is clear.      Comments: Yellowing noted to lingual frenulum   Eyes:      Pupils: Pupils are equal, round, and reactive to light.   Cardiovascular:      Rate and Rhythm: Normal rate and regular rhythm.      Pulses: Normal pulses.      Heart sounds: Normal heart sounds. No murmur heard.    No friction rub. No gallop.   Pulmonary:      Effort: Pulmonary effort is normal. No respiratory distress.      Breath sounds: Normal breath sounds. No stridor. No wheezing or rhonchi.   Abdominal:      General: Abdomen is flat.      Palpations: Abdomen is soft.      Tenderness: There is abdominal tenderness (TTP in mid epigastrium, RLQ).      Comments: Ostomy in place w/ liquid green/yellow stool noted. Stoma pink, well perfused   Musculoskeletal:         General: Deformity (RA deviation to PIP joints b/l) present. Normal range of motion.   Skin:     General: Skin is warm and dry.      Capillary Refill: Capillary refill takes less than 2 seconds.      Coloration:  Skin is pale.   Neurological:      General: No focal deficit present.      Mental Status: She is alert and oriented to person, place, and time.   Psychiatric:         Mood and Affect: Mood normal.         Behavior: Behavior normal.         Labs:   Recent Labs     02/22/22  1146   SODIUM 132*   POTASSIUM 5.0   CHLORIDE 108   CO2 13*   BUN 25*   CREATININE 1.46*   MAGNESIUM 1.7   CALCIUM 9.9       Recent Labs     02/22/22  1146   ALTSGPT 5   ASTSGOT 11*   ALKPHOSPHAT 59   TBILIRUBIN 0.3   GLUCOSE 117*       Recent Labs     02/22/22  1146   RBC 3.63*   HEMOGLOBIN 10.8*   HEMATOCRIT 33.5*   PLATELETCT 469*       Recent Labs     02/22/22  1146   WBC 12.8*   NEUTSPOLYS 75.90*   LYMPHOCYTES 12.90*   MONOCYTES 10.50   EOSINOPHILS 0.00   BASOPHILS 0.20   ASTSGOT 11*   ALTSGPT 5   ALKPHOSPHAT 59   TBILIRUBIN 0.3         EKG: Per my read, normal sinus rhythm, no ST changes appreciated    Imaging:   DX-CHEST-PORTABLE (1 VIEW)   Final Result      1.  There is no acute cardiopulmonary process.   2.  Lungs are hyperinflated suggestive of COPD or emphysematous change.      EC-ECHOCARDIOGRAM COMPLETE W/O CONT    (Results Pending)         Previous Data Review: reviewed    Problem Representation:   * Acute respiratory failure with hypoxia (HCC)- (present on admission)  Assessment & Plan  Suspected 2/2 PE vs. COVID vs underlying emphysematous disease state. Patient reports baseline is 3L w/ exertion. Procal and CXR revealing no acute signs of infection.  - COVID test ordered, will f/u results  - Cont OP xarelto treatment (15 mg BID)  - CT Chest ordered, will f/u results   - ABG ordered, revealing metabolic acidosis       Pulmonary embolism (HCC)  Assessment & Plan  Patient w/ reported history of PE diagnosed early Feb 22 at OSH. Currently taking xarelto 15 mg BID  - Will confirm duration of patients xarelto and cont inpatient  - Echo ordered, will f/u results    Cancer (HCC)  Assessment & Plan  Patient w/ reported h/o ovarian and  "colorectal cancer.  - OP oncologist is aware of patient, will f/u while here in hospital, appreciate recs  - Cont raloxifene     Metabolic acidosis with normal anion gap and bicarbonate losses  Assessment & Plan  Suspect metabolic acidosis likely 2/2 diarrhea in setting of increased ostomy output. ABG confirms adequate compensation at this time  - Will bolus fluid at this time and cont maintenance fluids thereafter        IMELDA (acute kidney injury) (HCC)  Assessment & Plan  Patient noted to have IMELDA on admission. Patient reports some h/o \"kidney problems\" however Cr baseline is unknown at this time. Potential IMELDA likely 2/2 volume depletion.  - Urine cr, electrolytes ordered, will f/u results  - Will bolus patient prn for suspected volume depletion  - Will monitor Cr w/ am labs  - Will avoid nephrotoxic agents as able    Elevated troponin  Assessment & Plan  Patient noted to have troponin elevated at 23, w/ no acute changes noted on EKG. Suspect 2/2 demand ischemia in setting of known PE. Denies CP at this time  - Repeat troponin ordered, will f/u.  - Repeat EKG if CP manifests or significant change in troponin    Malnutrition (HCC)  Assessment & Plan  Patient noted to have BMI 14.27 and reported h/o lack of appetite for the past several weeks. Suspect 2/2 underlying ovarian/colon CA.  - Concern for refeeding syndrome, will monitor daily labs and replete as needed  - Nutrition consulted, appreciate recs      AP (abdominal pain)  Assessment & Plan  Patient w/ marked TTP on abdominal exam. Concern for possible underlying abscess in the setting of recent abdominal surgery. Patient was trialed on OP abx for presumed abdominal infection.  - CT A/P ordered, will f/u results.   -Will initiate abx if abscess confirmed     CODE: DNR/DNI  DVT PPx: N/A on xarelto  GI PPx: N/A  Abx: N/A  Dispo: Remain inpt for continued investigation of AHRF    "

## 2022-02-23 ENCOUNTER — APPOINTMENT (OUTPATIENT)
Dept: RADIOLOGY | Facility: MEDICAL CENTER | Age: 82
DRG: 175 | End: 2022-02-23
Attending: STUDENT IN AN ORGANIZED HEALTH CARE EDUCATION/TRAINING PROGRAM
Payer: MEDICARE

## 2022-02-23 ENCOUNTER — APPOINTMENT (OUTPATIENT)
Dept: CARDIOLOGY | Facility: MEDICAL CENTER | Age: 82
DRG: 175 | End: 2022-02-23
Attending: STUDENT IN AN ORGANIZED HEALTH CARE EDUCATION/TRAINING PROGRAM
Payer: MEDICARE

## 2022-02-23 PROBLEM — C56.9 OVARIAN CA (HCC): Status: ACTIVE | Noted: 2022-02-22

## 2022-02-23 PROBLEM — N18.9 ACUTE KIDNEY INJURY SUPERIMPOSED ON CHRONIC KIDNEY DISEASE (HCC): Status: ACTIVE | Noted: 2022-02-22

## 2022-02-23 LAB
ALBUMIN SERPL BCP-MCNC: 3.4 G/DL (ref 3.2–4.9)
ALBUMIN/GLOB SERPL: 1.4 G/DL
ALP SERPL-CCNC: 49 U/L (ref 30–99)
ALT SERPL-CCNC: <5 U/L (ref 2–50)
ANION GAP SERPL CALC-SCNC: 9 MMOL/L (ref 7–16)
AST SERPL-CCNC: 9 U/L (ref 12–45)
BILIRUB SERPL-MCNC: 0.2 MG/DL (ref 0.1–1.5)
BUN SERPL-MCNC: 21 MG/DL (ref 8–22)
C DIFF DNA SPEC QL NAA+PROBE: NEGATIVE
C DIFF TOX GENS STL QL NAA+PROBE: NEGATIVE
CALCIUM SERPL-MCNC: 9.3 MG/DL (ref 8.5–10.5)
CHLORIDE SERPL-SCNC: 109 MMOL/L (ref 96–112)
CO2 SERPL-SCNC: 15 MMOL/L (ref 20–33)
CREAT SERPL-MCNC: 0.89 MG/DL (ref 0.5–1.4)
ERYTHROCYTE [DISTWIDTH] IN BLOOD BY AUTOMATED COUNT: 55.6 FL (ref 35.9–50)
FERRITIN SERPL-MCNC: 135 NG/ML (ref 10–291)
GLOBULIN SER CALC-MCNC: 2.4 G/DL (ref 1.9–3.5)
GLUCOSE SERPL-MCNC: 100 MG/DL (ref 65–99)
HCT VFR BLD AUTO: 29 % (ref 37–47)
HGB BLD-MCNC: 9.3 G/DL (ref 12–16)
HGB RETIC QN AUTO: 35.2 PG/CELL (ref 29–35)
IMM RETICS NFR: 12.1 % (ref 9.3–17.4)
LV EJECT FRACT  99904: 60
LV EJECT FRACT MOD 2C 99903: 71.29
LV EJECT FRACT MOD 4C 99902: 52.37
LV EJECT FRACT MOD BP 99901: 63.93
MAGNESIUM SERPL-MCNC: 1.5 MG/DL (ref 1.5–2.5)
MCH RBC QN AUTO: 29.2 PG (ref 27–33)
MCHC RBC AUTO-ENTMCNC: 32.1 G/DL (ref 33.6–35)
MCV RBC AUTO: 91.2 FL (ref 81.4–97.8)
PHOSPHATE SERPL-MCNC: 3.1 MG/DL (ref 2.5–4.5)
PLATELET # BLD AUTO: 359 K/UL (ref 164–446)
PMV BLD AUTO: 8.8 FL (ref 9–12.9)
POTASSIUM SERPL-SCNC: 4.6 MMOL/L (ref 3.6–5.5)
PROT SERPL-MCNC: 5.8 G/DL (ref 6–8.2)
RBC # BLD AUTO: 3.18 M/UL (ref 4.2–5.4)
RETICS # AUTO: 0.04 M/UL (ref 0.04–0.06)
RETICS/RBC NFR: 1.3 % (ref 0.8–2.1)
SODIUM SERPL-SCNC: 133 MMOL/L (ref 135–145)
T4 FREE SERPL-MCNC: 1.02 NG/DL (ref 0.93–1.7)
WBC # BLD AUTO: 9.4 K/UL (ref 4.8–10.8)

## 2022-02-23 PROCEDURE — 700111 HCHG RX REV CODE 636 W/ 250 OVERRIDE (IP): Performed by: STUDENT IN AN ORGANIZED HEALTH CARE EDUCATION/TRAINING PROGRAM

## 2022-02-23 PROCEDURE — B548ZZA ULTRASONOGRAPHY OF SUPERIOR VENA CAVA, GUIDANCE: ICD-10-PCS | Performed by: STUDENT IN AN ORGANIZED HEALTH CARE EDUCATION/TRAINING PROGRAM

## 2022-02-23 PROCEDURE — 82728 ASSAY OF FERRITIN: CPT

## 2022-02-23 PROCEDURE — A9270 NON-COVERED ITEM OR SERVICE: HCPCS | Performed by: INTERNAL MEDICINE

## 2022-02-23 PROCEDURE — 700102 HCHG RX REV CODE 250 W/ 637 OVERRIDE(OP): Performed by: STUDENT IN AN ORGANIZED HEALTH CARE EDUCATION/TRAINING PROGRAM

## 2022-02-23 PROCEDURE — A9270 NON-COVERED ITEM OR SERVICE: HCPCS | Performed by: STUDENT IN AN ORGANIZED HEALTH CARE EDUCATION/TRAINING PROGRAM

## 2022-02-23 PROCEDURE — 80053 COMPREHEN METABOLIC PANEL: CPT

## 2022-02-23 PROCEDURE — 84439 ASSAY OF FREE THYROXINE: CPT

## 2022-02-23 PROCEDURE — 85027 COMPLETE CBC AUTOMATED: CPT

## 2022-02-23 PROCEDURE — 700105 HCHG RX REV CODE 258: Performed by: STUDENT IN AN ORGANIZED HEALTH CARE EDUCATION/TRAINING PROGRAM

## 2022-02-23 PROCEDURE — 02HV33Z INSERTION OF INFUSION DEVICE INTO SUPERIOR VENA CAVA, PERCUTANEOUS APPROACH: ICD-10-PCS | Performed by: STUDENT IN AN ORGANIZED HEALTH CARE EDUCATION/TRAINING PROGRAM

## 2022-02-23 PROCEDURE — 770020 HCHG ROOM/CARE - TELE (206)

## 2022-02-23 PROCEDURE — 93306 TTE W/DOPPLER COMPLETE: CPT

## 2022-02-23 PROCEDURE — 84100 ASSAY OF PHOSPHORUS: CPT

## 2022-02-23 PROCEDURE — 93306 TTE W/DOPPLER COMPLETE: CPT | Mod: 26 | Performed by: INTERNAL MEDICINE

## 2022-02-23 PROCEDURE — 99232 SBSQ HOSP IP/OBS MODERATE 35: CPT | Performed by: INTERNAL MEDICINE

## 2022-02-23 PROCEDURE — 302098 PASTE RING (FLAT): Performed by: INTERNAL MEDICINE

## 2022-02-23 PROCEDURE — 36415 COLL VENOUS BLD VENIPUNCTURE: CPT

## 2022-02-23 PROCEDURE — 700102 HCHG RX REV CODE 250 W/ 637 OVERRIDE(OP): Performed by: INTERNAL MEDICINE

## 2022-02-23 PROCEDURE — 85046 RETICYTE/HGB CONCENTRATE: CPT

## 2022-02-23 PROCEDURE — 97162 PT EVAL MOD COMPLEX 30 MIN: CPT

## 2022-02-23 PROCEDURE — 97165 OT EVAL LOW COMPLEX 30 MIN: CPT

## 2022-02-23 PROCEDURE — 83735 ASSAY OF MAGNESIUM: CPT

## 2022-02-23 PROCEDURE — 302106 OSTOMY POWDER: Performed by: INTERNAL MEDICINE

## 2022-02-23 RX ORDER — GAUZE BANDAGE 2" X 2"
100 BANDAGE TOPICAL DAILY
Status: DISCONTINUED | OUTPATIENT
Start: 2022-02-23 | End: 2022-02-25 | Stop reason: HOSPADM

## 2022-02-23 RX ORDER — CALCIUM POLYCARBOPHIL 625 MG 625 MG/1
625 TABLET ORAL
Status: DISCONTINUED | OUTPATIENT
Start: 2022-02-23 | End: 2022-02-25 | Stop reason: HOSPADM

## 2022-02-23 RX ORDER — MAGNESIUM SULFATE HEPTAHYDRATE 40 MG/ML
4 INJECTION, SOLUTION INTRAVENOUS ONCE
Status: COMPLETED | OUTPATIENT
Start: 2022-02-23 | End: 2022-02-23

## 2022-02-23 RX ORDER — CALCIUM POLYCARBOPHIL 625 MG 625 MG/1
625 TABLET ORAL
Status: DISCONTINUED | OUTPATIENT
Start: 2022-02-23 | End: 2022-02-23

## 2022-02-23 RX ORDER — SODIUM BICARBONATE 650 MG/1
650 TABLET ORAL 3 TIMES DAILY
Status: DISCONTINUED | OUTPATIENT
Start: 2022-02-23 | End: 2022-02-25

## 2022-02-23 RX ADMIN — OMEPRAZOLE 20 MG: 20 CAPSULE, DELAYED RELEASE ORAL at 06:01

## 2022-02-23 RX ADMIN — RIVAROXABAN 15 MG: 15 TABLET, FILM COATED ORAL at 16:36

## 2022-02-23 RX ADMIN — SODIUM CHLORIDE, POTASSIUM CHLORIDE, SODIUM LACTATE AND CALCIUM CHLORIDE: 600; 310; 30; 20 INJECTION, SOLUTION INTRAVENOUS at 20:35

## 2022-02-23 RX ADMIN — LOVASTATIN 20 MG: 20 TABLET ORAL at 06:01

## 2022-02-23 RX ADMIN — MAGNESIUM SULFATE HEPTAHYDRATE 4 G: 40 INJECTION, SOLUTION INTRAVENOUS at 09:34

## 2022-02-23 RX ADMIN — SODIUM BICARBONATE 650 MG: 650 TABLET ORAL at 15:34

## 2022-02-23 RX ADMIN — THERA TABS 1 TABLET: TAB at 06:01

## 2022-02-23 RX ADMIN — Medication 100 MG: at 20:34

## 2022-02-23 RX ADMIN — METOPROLOL TARTRATE 25 MG: 25 TABLET, FILM COATED ORAL at 16:36

## 2022-02-23 RX ADMIN — CALCIUM POLYCARBOPHIL 625 MG: 625 TABLET, FILM COATED ORAL at 16:36

## 2022-02-23 RX ADMIN — ACETAMINOPHEN 650 MG: 325 TABLET, FILM COATED ORAL at 00:30

## 2022-02-23 RX ADMIN — ACETAMINOPHEN 650 MG: 325 TABLET, FILM COATED ORAL at 07:56

## 2022-02-23 RX ADMIN — CALCIUM POLYCARBOPHIL 625 MG: 625 TABLET, FILM COATED ORAL at 12:40

## 2022-02-23 RX ADMIN — VANCOMYCIN HYDROCHLORIDE 125 MG: KIT ORAL at 06:01

## 2022-02-23 RX ADMIN — VANCOMYCIN HYDROCHLORIDE 125 MG: KIT ORAL at 00:20

## 2022-02-23 RX ADMIN — SODIUM BICARBONATE 650 MG: 650 TABLET ORAL at 20:34

## 2022-02-23 RX ADMIN — RALOXIFENE 60 MG: 60 TABLET ORAL at 06:01

## 2022-02-23 RX ADMIN — RIVAROXABAN 15 MG: 15 TABLET, FILM COATED ORAL at 07:50

## 2022-02-23 RX ADMIN — ACETAMINOPHEN 650 MG: 325 TABLET, FILM COATED ORAL at 20:34

## 2022-02-23 RX ADMIN — Medication 1000 UNITS: at 06:01

## 2022-02-23 RX ADMIN — SODIUM BICARBONATE 650 MG: 650 TABLET ORAL at 12:03

## 2022-02-23 RX ADMIN — METOPROLOL TARTRATE 25 MG: 25 TABLET, FILM COATED ORAL at 06:01

## 2022-02-23 ASSESSMENT — GAIT ASSESSMENTS
DEVIATION: BRADYKINETIC
GAIT LEVEL OF ASSIST: SUPERVISED
ASSISTIVE DEVICE: FRONT WHEEL WALKER
DISTANCE (FEET): 20

## 2022-02-23 ASSESSMENT — ENCOUNTER SYMPTOMS
BLURRED VISION: 0
MYALGIAS: 0
SORE THROAT: 0
SHORTNESS OF BREATH: 0
FEVER: 0
COUGH: 0
HEADACHES: 0
VOMITING: 0
BACK PAIN: 0
NAUSEA: 0
PALPITATIONS: 0
CHILLS: 0
TINGLING: 0
DOUBLE VISION: 0
DIARRHEA: 1
CONSTIPATION: 0
ABDOMINAL PAIN: 0

## 2022-02-23 ASSESSMENT — COGNITIVE AND FUNCTIONAL STATUS - GENERAL
MOVING FROM LYING ON BACK TO SITTING ON SIDE OF FLAT BED: A LITTLE
DAILY ACTIVITIY SCORE: 21
HELP NEEDED FOR BATHING: A LITTLE
STANDING UP FROM CHAIR USING ARMS: A LITTLE
DRESSING REGULAR LOWER BODY CLOTHING: A LITTLE
WALKING IN HOSPITAL ROOM: A LITTLE
CLIMB 3 TO 5 STEPS WITH RAILING: A LITTLE
SUGGESTED CMS G CODE MODIFIER MOBILITY: CK
SUGGESTED CMS G CODE MODIFIER DAILY ACTIVITY: CJ
MOVING TO AND FROM BED TO CHAIR: A LITTLE
TURNING FROM BACK TO SIDE WHILE IN FLAT BAD: A LITTLE
MOBILITY SCORE: 18
TOILETING: A LITTLE

## 2022-02-23 ASSESSMENT — ACTIVITIES OF DAILY LIVING (ADL): TOILETING: INDEPENDENT

## 2022-02-23 ASSESSMENT — PAIN DESCRIPTION - PAIN TYPE
TYPE: ACUTE PAIN
TYPE: ACUTE PAIN

## 2022-02-23 ASSESSMENT — FIBROSIS 4 INDEX
FIB4 SCORE: 0.98
FIB4 SCORE: 0.98

## 2022-02-23 NOTE — ASSESSMENT & PLAN NOTE
Patient w/ h/o ovarian CA  - OP oncologist is aware of patient, will f/u while here in hospital, appreciate recs  - Cont raloxifene   - Likely initiate chemotherapy in inpatient setting, order for PICC line placed

## 2022-02-23 NOTE — PROGRESS NOTES
GYN/Oncology Progress Note               Author: LORNA Ibarra Date & Time created: 2/23/2022  2:46 PM     Interval History:  Patient doing well, shortness of breath has improved. She is unsure if she has any increased energy today.     Review of Systems:  Review of Systems   Constitutional: Positive for malaise/fatigue. Negative for chills and fever.   Respiratory: Negative for cough and shortness of breath.    Cardiovascular: Negative for chest pain and leg swelling.   Gastrointestinal: Positive for diarrhea. Negative for abdominal pain, nausea and vomiting.   Genitourinary: Negative for dysuria, frequency and urgency.       Physical Exam:  Physical Exam  Constitutional:       Appearance: Normal appearance.   Pulmonary:      Effort: Pulmonary effort is normal.   Abdominal:      Palpations: Abdomen is soft.      Comments: Left ostomy with liquid stool    Musculoskeletal:         General: No swelling.   Skin:     General: Skin is warm and dry.      Capillary Refill: Capillary refill takes 2 to 3 seconds.   Neurological:      Mental Status: She is alert and oriented to person, place, and time.         Labs:  Recent Labs     02/22/22  1438   [id number] 7.33*   [id number] 25.6*   [id number] 206.4*   [id number] 99.3*   ARTHCO3 13*   ARTBE -11*         Recent Labs     02/22/22  1146 02/22/22  1735 02/23/22  0158   SODIUM 132*  --  133*   POTASSIUM 5.0  --  4.6   CHLORIDE 108  --  109   CO2 13*  --  15*   BUN 25*  --  21   CREATININE 1.46*  --  0.89   MAGNESIUM 1.7  --  1.5   PHOSPHORUS  --  3.2 3.1   CALCIUM 9.9  --  9.3     Recent Labs     02/22/22  1146 02/22/22  1438 02/23/22  0158   ALTSGPT 5  --  <5   ASTSGOT 11*  --  9*   ALKPHOSPHAT 59  --  49   TBILIRUBIN 0.3  --  0.2   PREALBUMIN  --  27.9  --    GLUCOSE 117*  --  100*     Recent Labs     02/22/22  1146 02/23/22  0158   RBC 3.63* 3.18*   HEMOGLOBIN 10.8* 9.3*   HEMATOCRIT 33.5* 29.0*   PLATELETCT 469* 359   FERRITIN  --  135.0     Recent Labs      22  1146 22  0158   WBC 12.8* 9.4   NEUTSPOLYS 75.90*  --    LYMPHOCYTES 12.90*  --    MONOCYTES 10.50  --    EOSINOPHILS 0.00  --    BASOPHILS 0.20  --    ASTSGOT 11* 9*   ALTSGPT 5 <5   ALKPHOSPHAT 59 49   TBILIRUBIN 0.3 0.2     Recent Labs     22  1146 22  0158   SODIUM 132* 133*   POTASSIUM 5.0 4.6   CHLORIDE 108 109   CO2 13* 15*   GLUCOSE 117* 100*   BUN 25* 21   CREATININE 1.46* 0.89   CALCIUM 9.9 9.3     Hemodynamics:  Temp (24hrs), Av.4 °C (97.5 °F), Min:36.2 °C (97.1 °F), Max:36.7 °C (98 °F)  Temperature: 36.3 °C (97.4 °F)  Pulse  Av.3  Min: 76  Max: 110   Blood Pressure : 121/55     Respiratory:    Respiration: 16, Pulse Oximetry: 100 %     Work Of Breathing / Effort: Mild  RUL Breath Sounds: Clear, RML Breath Sounds: Diminished, RLL Breath Sounds: Diminished, KENNY Breath Sounds: Clear, LLL Breath Sounds: Diminished  Fluids:    Intake/Output Summary (Last 24 hours) at 2022 1446  Last data filed at 2022 0500  Gross per 24 hour   Intake --   Output 300 ml   Net -300 ml     Weight: 37.5 kg (82 lb 10.8 oz)  GI/Nutrition:  Orders Placed This Encounter   Procedures   • Diet Order Diet: Regular     Standing Status:   Standing     Number of Occurrences:   1     Order Specific Question:   Diet:     Answer:   Regular [1]     Medical Decision Making, by Problem:  Active Hospital Problems    Diagnosis    • *Acute respiratory failure with hypoxia (HCC) [J96.01]    • AP (abdominal pain) [R10.9]    • Malnutrition (HCC) [E46]    • Elevated troponin [R77.8]    • Acute kidney injury superimposed on chronic kidney disease (HCC) [N17.9, N18.9]    • Metabolic acidosis with normal anion gap and bicarbonate losses [E87.2]    • Ovarian ca (HCC) [C56.9]    • Pulmonary embolism (HCC) [I26.99]        Plan:  This is a 82 y.o.female w/ stage IIIC HGSOC, status post optimal cytoreductive surgery, admitted with worsening SOB/TORREZ and hypoxia:      1. SOB: concern for underlying cardiopulmonary  process given significant exacerbation since recent d/c from the hospital last week w/ same, cardiopulmonary w/u to eval for CHF, cardiac dysfunction, anticoagulation failure w/ worsening PE, PNA, no pleural effusion or other underlying condition aside from COPD noted per recent CXR,  appreciate IM recs, echo pending  2. Hypoxia: improved, continue O2 supplementation and monitor demand  3. Dehydration: patient has ileostomy w/ poor appetite, IVF resuscitation  4. H/o PE: continue xarelto, consider repeat CTA to eval for worsening PE  5. Leukocytosis: afebrile with no clinical signs of infection, suspect d/t malignancy vs. Hemoconcentration d/t dehydration  6. CKD: baseline Cr ~ 1, most recently 1.4 on 2/21, improved with IVF   7. Ovarian cancer: s/p cytoreductive surgery, due to begin chemotherapy, plan to initiate inpatient if neg cardiopulm w/u  8. Poor venous access: will need PICC line placement    Case discussed with Dr. Maloney    Quality-Core Measures

## 2022-02-23 NOTE — CARE PLAN
Problem: Knowledge Deficit - Standard  Goal: Patient and family/care givers will demonstrate understanding of plan of care, disease process/condition, diagnostic tests and medications  Outcome: Progressing  Note: Pt's whiteboard is updated, pt has been updated on POC, all questions have been answered at this time       Problem: Pain - Standard  Goal: Alleviation of pain or a reduction in pain to the patient’s comfort goal  Outcome: Progressing  Note: Pain interventions will allow pt to participate in physical activity      Problem: Fall Risk  Goal: Patient will remain free from falls  Outcome: Progressing  Note: Bed locked in lowest position. Bed alarm on. Treaded socks in use. Call light and belongings within reach. Pt educated to call for assistance. Pt verbalized understanding. Hourly rounding in place.    The patient is Watcher - Medium risk of patient condition declining or worsening    Shift Goals  Clinical Goals: wean O2, ambulate  Patient Goals: comfort    Progress made toward(s) clinical / shift goals:  ambulate, maintain adequate oxygen saturtion     Patient is not progressing towards the following goals:

## 2022-02-23 NOTE — CARE PLAN
The patient is Stable - Low risk of patient condition declining or worsening    Shift Goals  Clinical Goals: Decrease O2 demand  Patient Goals: Sleep comfortably    Progress made toward(s) clinical / shift goals:  progressing       Problem: Pain - Standard  Goal: Alleviation of pain or a reduction in pain to the patient’s comfort goal  Outcome: Progressing  Note: Patient c/o 3/10 in LUQ abdomen.  Medicated per MAR.      Problem: Fall Risk  Goal: Patient will remain free from falls  Outcome: Progressing  Note: Patient is a moderate fall risk.  All fall precautions in place.  Patient verbalized understanding of fall risk.

## 2022-02-23 NOTE — ASSESSMENT & PLAN NOTE
Improved  Suspect metabolic acidosis likely 2/2 diarrhea in setting of increased ostomy output. ABG confirms adequate compensation at this time  - Will cont maintenance fluids  - Will initiate bicarbonate on this admission

## 2022-02-23 NOTE — PROGRESS NOTES
Bedside report received from night shift RN. Assumed care at 0700. Pt is A&Ox4. Pt is in bed. Pt c/o mild abdominal pain, will medicate per MAR. Pt was updated on plan of care. Pt has call light, personal belongings, and bedside table within reach. Bed is in the lowest position and bed alarm is on. Will continue to monitor

## 2022-02-23 NOTE — WOUND TEAM
Patient has an established colostomy and has been participating in ostomy care with home health.  Patient is currently using 2 3/4 barrier and 2/34 fecal pouch with crusting and a 2' paste ring.  New orders place for nursing to assist patient with ostomy care.  Supplies ordered.  Please re-consult if additional help is needed.  Thank you.

## 2022-02-23 NOTE — ASSESSMENT & PLAN NOTE
Patient w/ reported history of PE diagnosed early Feb 22 at OSH. Currently taking xarelto 15 mg BID  - Will confirm duration of patients xarelto and cont inpatient  - Echo ordered, will f/u results

## 2022-02-23 NOTE — ASSESSMENT & PLAN NOTE
Resolved  Suspected 2/2 volume depletion. Improvement noted after fluid repletion. Per chart review, pt w/ h/o CKD, baseline Cr 1.0  - Will ctm  - Will avoid nephrotoxic agents as able

## 2022-02-23 NOTE — PROGRESS NOTES
4 Eyes Skin Assessment Completed by Oneil RN and KIKA Perez.    Head WDL  Ears WDL  Nose WDL  Mouth WDL  Neck WDL  Breast/Chest WDL  Shoulder Blades WDL  Spine WDL  (R) Arm/Elbow/Hand WDL  (L) Arm/Elbow/Hand WDL  Abdomen Colostomy  Groin WDL  Scrotum/Coccyx/Buttocks Redness and Blanching  (R) Leg WDL  (L) Leg WDL  (R) Heel/Foot/Toe WDL  (L) Heel/Foot/Toe WDL          Devices In Places Tele Box      Interventions In Place Gray Ear Foams, Heel Mepilex, Sacral Mepilex, Elbow Mepilex and Heels Loaded W/Pillows    Possible Skin Injury No    Pictures Uploaded Into Epic N/A  Wound Consult Placed N/A  RN Wound Prevention Protocol Ordered No

## 2022-02-23 NOTE — ASSESSMENT & PLAN NOTE
Patient noted to have BMI 14.27 and reported h/o lack of appetite for the past several weeks. Suspect 2/2 underlying ovarian/colon CA.  - Concern for refeeding syndrome, will monitor daily labs and replete as needed  - Nutrition consulted, appreciate recs

## 2022-02-23 NOTE — PROGRESS NOTES
Bedside report received from day shift RN. Assumed care at 1900. Pt is A&Ox4. Pt is in bed. Patient is on 4L NC. Pt was updated on plan of care. Pt has call light, personal belongings, and bedside table within reach. Bed locked and in lowest position.

## 2022-02-23 NOTE — THERAPY
"Occupational Therapy   Initial Evaluation     Patient Name: Vira Carmona  Age:  82 y.o., Sex:  female  Medical Record #: [id number]  Today's Date: 2/23/2022          Assessment  Patient is 82 y.o. female who presents to acute due to worsening O2 demands. PMH includes HTN, CKD, HLD, ovarian cancer and hx of PEs. Pt appears close to functional baseline performing BADLs at SPV level. No further acute OT needs noted at this time. Recommend DC home.     Plan    Recommend Occupational Therapy DC needs only    DC Equipment Recommendations: (P) None  Discharge Recommendations: (P) Anticipate that the patient will have no further occupational therapy needs after discharge from the hospital     Subjective    \"My son brought me cookies, would you like one?\"     Objective       02/23/22 1041   Total Time Spent   Total Time Spent (Mins) 25   Charge Group   OT Evaluation OT Evaluation Low   Initial Contact Note    Initial Contact Note Order Received and Verified, Occupational Therapy Evaluation in Progress with Full Report to Follow.   Prior Living Situation   Prior Services None   Housing / Facility 1 Story House   Bathroom Set up Walk In Shower   Equipment Owned Front-Wheel Walker   Lives with - Patient's Self Care Capacity Adult Children   Comments Pt lives w/ her son who is available to assist PRN, reports she has a friend come over when she showers   Prior Level of ADL Function   Self Feeding Independent   Grooming / Hygiene Independent   Bathing Independent   Dressing Independent   Toileting Independent   Comments reports friend provides SPV for bathing   Prior Level of IADL Function   Medication Management Independent   Laundry Independent   Kitchen Mobility Requires Assist   Finances Independent   Home Management Requires Assist   Shopping Requires Assist   Prior Level Of Mobility Independent With Device in Community;Independent With Device in Home   Vitals   O2 (LPM) 1   O2 Delivery Device Silicone Nasal Cannula "   Pain 0 - 10 Group   Therapist Pain Assessment Post Activity Pain Same as Prior to Activity;Nurse Notified  (no c/o pain during session)   Cognition    Cognition / Consciousness WDL   Comments pleasent and cooperative   Active ROM Upper Body   Active ROM Upper Body  WDL   Coordination Upper Body   Coordination WDL   Balance Assessment   Sitting Balance (Static) Good   Sitting Balance (Dynamic) Fair +   Standing Balance (Static) Fair   Standing Balance (Dynamic) Fair -   Weight Shift Sitting Good   Weight Shift Standing Fair   Comments w/ FWW   Bed Mobility    Supine to Sit Supervised   Sit to Supine   (NT in chair post)   Scooting Supervised   ADL Assessment   Grooming Supervision;Standing   Upper Body Dressing Supervision   Lower Body Dressing Supervision   Toileting Supervision  (including pant management)   How much help from another person does the patient currently need...   Putting on and taking off regular lower body clothing? 3   Bathing (including washing, rinsing, and drying)? 3   Toileting, which includes using a toilet, bedpan, or urinal? 3   Putting on and taking off regular upper body clothing? 4   Taking care of personal grooming such as brushing teeth? 4   Eating meals? 4   6 Clicks Daily Activity Score 21   Functional Mobility   Sit to Stand Supervised   Bed, Chair, Wheelchair Transfer Supervised   Toilet Transfers Supervised   Mobility within room and bathroom w/ FWW   Activity Tolerance   Sitting in Chair 10+ min (up post))   Sitting Edge of Bed 5 min   Standing 8 min   Comments no overt s/s of fatigue   Short Term Goals   Short Term Goal # 1 Pt will have no further acute OT needs by time of DC   Education Group   Role of Occupational Therapist Patient Response Patient;Acceptance;Explanation;Demonstration;Verbal Demonstration   Problem List   Problem List Decreased Active Daily Living Skills;Decreased Homemaking Skills;Impaired Postural Control / Balance   Anticipated Discharge Equipment and  Recommendations   DC Equipment Recommendations None   Discharge Recommendations Anticipate that the patient will have no further occupational therapy needs after discharge from the hospital   Interdisciplinary Plan of Care Collaboration   IDT Collaboration with  Nursing   Patient Position at End of Therapy Call Light within Reach;Tray Table within Reach;Phone within Reach;Seated;Chair Alarm On   Collaboration Comments report given   Session Information   Date / Session Number  2/23, DC needs only   Priority 0

## 2022-02-23 NOTE — PROGRESS NOTES
Daily Progress Note:     Date of Service: 2/23/2022  Primary Team: [facility] IM White Team   Attending: Fabio Cuenca M.D.   Senior Resident: Dr. Mahogany Gould  Intern: Dr. Martinez  Contact:  [phone]    Chief Complaint/ID:   82 y.o. female who presented 2/22/2022 with PMH of HTN, CKD, HLD, ovarian cancer, PE, who presented to our facility at the request of her OP oncologist due to worsening O2 demands.     Subjective:   No acute events since admission. Patient reports improvement in her overall SOB, but still experiences TORREZ. Patient denies any CP, f/c, n/v, abdominal pain at this time. Patient reports continued high output from her ileostomy.     Consultants/Specialty:  Gyn-onc    Review of Systems:    Review of Systems   Constitutional: Negative for chills and fever.   HENT: Negative for hearing loss and sore throat.    Eyes: Negative for blurred vision and double vision.   Respiratory: Negative for cough and shortness of breath.    Cardiovascular: Negative for chest pain and palpitations.   Gastrointestinal: Positive for diarrhea. Negative for constipation.   Musculoskeletal: Negative for back pain and myalgias.   Neurological: Negative for tingling and headaches.       Objective Data:   Physical Exam:   Vitals:   Temp:  [36.2 °C (97.1 °F)-36.7 °C (98 °F)] 36.3 °C (97.4 °F)  Pulse:  [] 90  Resp:  [16-20] 16  BP: (115-153)/(52-78) 121/55  SpO2:  [96 %-100 %] 100 %     Physical Exam  Constitutional:       Comments: Cachetic appearance   HENT:      Head: Normocephalic and atraumatic.      Mouth/Throat:      Mouth: Mucous membranes are moist.      Pharynx: Oropharynx is clear.      Comments: Yellowing noted to lingual frenulum   Eyes:      Pupils: Pupils are equal, round, and reactive to light.   Cardiovascular:      Rate and Rhythm: Normal rate and regular rhythm.      Pulses: Normal pulses.      Heart sounds: Normal heart sounds. No murmur heard.    No friction rub. No gallop.   Pulmonary:      Effort:  Pulmonary effort is normal. No respiratory distress.      Breath sounds: Normal breath sounds. No stridor. No wheezing or rhonchi.   Abdominal:      General: Abdomen is flat.      Palpations: Abdomen is soft.      Tenderness: No TTP     Comments: Ileostomy in place w/ liquid brown stool noted. Stoma pink, well perfused   Musculoskeletal:         General: Deformity (RA deviation to PIP joints b/l) present. Normal range of motion.   Skin:     General: Skin is warm and dry.      Capillary Refill: Capillary refill takes less than 2 seconds.    Neurological:      General: No focal deficit present.      Mental Status: She is alert and oriented to person, place, and time.   Psychiatric:         Mood and Affect: Mood normal.         Behavior: Behavior normal.           Labs:   Recent Labs     02/22/22  1146 02/22/22  1735 02/23/22  0158   SODIUM 132*  --  133*   POTASSIUM 5.0  --  4.6   CHLORIDE 108  --  109   CO2 13*  --  15*   BUN 25*  --  21   CREATININE 1.46*  --  0.89   MAGNESIUM 1.7  --  1.5   PHOSPHORUS  --  3.2 3.1   CALCIUM 9.9  --  9.3       Recent Labs     02/22/22  1146 02/22/22  1438 02/23/22  0158   ALTSGPT 5  --  <5   ASTSGOT 11*  --  9*   ALKPHOSPHAT 59  --  49   TBILIRUBIN 0.3  --  0.2   PREALBUMIN  --  27.9  --    GLUCOSE 117*  --  100*       Recent Labs     02/22/22  1146 02/23/22  0158   RBC 3.63* 3.18*   HEMOGLOBIN 10.8* 9.3*   HEMATOCRIT 33.5* 29.0*   PLATELETCT 469* 359   FERRITIN  --  135.0       Recent Labs     02/22/22  1146 02/23/22  0158   WBC 12.8* 9.4   NEUTSPOLYS 75.90*  --    LYMPHOCYTES 12.90*  --    MONOCYTES 10.50  --    EOSINOPHILS 0.00  --    BASOPHILS 0.20  --    ASTSGOT 11* 9*   ALTSGPT 5 <5   ALKPHOSPHAT 59 49   TBILIRUBIN 0.3 0.2       Imaging:   EC-ECHOCARDIOGRAM COMPLETE W/O CONT         CT-CTA CHEST PULMONARY ARTERY W/ RECONS   Final Result         1.  Right middle lobe subsegmental pulmonary embolus, no radiographic evidence of right heart strain is appreciated   2.   Atherosclerosis.      These findings were discussed with the patient's clinician, Dr. Garcia, on 2/22/2022 9:00 PM.      CT-ABDOMEN-PELVIS WITH   Final Result         1.      DX-CHEST-PORTABLE (1 VIEW)   Final Result      1.  There is no acute cardiopulmonary process.   2.  Lungs are hyperinflated suggestive of COPD or emphysematous change.      IR-PICC LINE PLACEMENT W/ GUIDANCE > AGE 5    (Results Pending)       Problem Representation:     * Acute respiratory failure with hypoxia (HCC)- (present on admission)  Assessment & Plan  Suspected 2/2 PE. CTPE confirming PE, no signs of R heart strain. Patient reports baseline previously was 3L w/ exertion. Procal and CXR revealing no acute signs of infection. COVID negative.   - Cont OP xarelto treatment (15 mg BID) for loading, will transition to QD on 2/24/22        Pulmonary embolism (HCC)  Assessment & Plan  Patient w/ reported history of PE diagnosed early Feb 22 at OSH. Currently taking xarelto 15 mg BID  - Will confirm duration of patients xarelto and cont inpatient  - Echo ordered, will f/u results    Ovarian ca (HCC)  Assessment & Plan  Patient w/ h/o ovarian CA  - OP oncologist is aware of patient, will f/u while here in hospital, appreciate recs  - Cont raloxifene   - Likely initiate chemotherapy in inpatient setting, order for PICC line placed    Metabolic acidosis with normal anion gap and bicarbonate losses  Assessment & Plan  Improved  Suspect metabolic acidosis likely 2/2 diarrhea in setting of increased ostomy output. ABG confirms adequate compensation at this time  - Will cont maintenance fluids  - Will initiate bicarbonate on this admission        Acute kidney injury superimposed on chronic kidney disease (HCC)  Assessment & Plan  Resolved  Suspected 2/2 volume depletion. Improvement noted after fluid repletion  - Will ctm  - Will avoid nephrotoxic agents as able    Elevated troponin  Assessment & Plan  Improved  Patient noted to have troponin elevated  at 23, w/ no acute changes noted on EKG. Suspect 2/2 demand ischemia in setting of known PE. Denies CP at this time  - Repeat EKG and trops if CP manifests or significant change in troponin    Malnutrition (HCC)  Assessment & Plan  Patient noted to have BMI 14.27 and reported h/o lack of appetite for the past several weeks. Suspect 2/2 underlying ovarian/colon CA.  - Concern for refeeding syndrome, will monitor daily labs and replete as needed  - Nutrition consulted, appreciate recs      AP (abdominal pain)  Assessment & Plan  Resolved  CT A/P ordered revealing no acute process. Suspect likely 2/2 increased ileostomy output

## 2022-02-23 NOTE — PROGRESS NOTES
Assumed care of patient. Report received from [facility] emergency RN. Pt escorted to tele by ER RN. Pt is A&Ox4, states 6/10 abdominal pain. Cardiac heart monitor confirmed and visualized by monitor tech. Lactic & Trop lab placed stat. All fall precautions in place. All questions answered.

## 2022-02-23 NOTE — THERAPY
"Physical Therapy   Initial Evaluation     Patient Name: Vira Carmona  Age:  82 y.o., Sex:  female  Medical Record #: [id number]  Today's Date: 2/23/2022     Precautions  Precautions: Fall Risk  Comments: colostomy    Assessment  Patient is 82 y.o. female admitted with SOB and abdominal pain. PMHx of PE in Jan now AC, HTN, osteopenia, ovarian and colon CA with bowel resection and colostomy placement 1 mo ago. Pt required SPV for all mobility including ambulating 20ft with FWW, limited 2/2 fatigue. Pt lives with son who assists as needed, friend comes over to assist as well. Pt reports slower than her baseline, however, anticipate quick progression to baseline with good support at home. Recommend home with no needs. Will continue to follow in order for pt to demo household distance ambulation and negotiation of 2 steps to safely enter home.    Plan    Recommend Physical Therapy 3 times per week until therapy goals are met for the following treatments:  Bed Mobility, Equipment, Gait Training, Manual Therapy, Neuro Re-Education / Balance, Self Care/Home Evaluation, Stair Training, Therapeutic Activities, and Therapeutic Exercises    DC Equipment Recommendations: None (pt has needed DME)  Discharge Recommendations: Anticipate that the patient will have no further physical therapy needs after discharge from the hospital       Subjective    \"I've never seen a bathroom I don't like.\"      Objective     02/23/22 1030   Total Time Spent   Total Time Spent (Mins) 20   Charge Group   PT Evaluation PT Evaluation Mod   Initial Contact Note    Initial Contact Note Order Received and Verified, Physical Therapy Evaluation in Progress with Full Report to Follow.   Precautions   Precautions Fall Risk   Comments colostomy   Vitals   O2 (LPM) 1   O2 Delivery Device Silicone Nasal Cannula   Vitals Comments Required 3L with ambulation (baseline)   Pain 0 - 10 Group   Therapist Pain Assessment Post Activity Pain Same as Prior to " Activity;Nurse Notified;0   Prior Living Situation   Prior Services None   Housing / Facility 1 Story House   Steps Into Home 2  (fits FWW)   Rail Both Rail (Steps into Home)   Equipment Owned Front-Wheel Walker   Lives with - Patient's Self Care Capacity Adult Children   Comments Pt lives with her Son and has caregiver (friend) come over to assist with showers   Prior Level of Functional Mobility   Bed Mobility Independent   Transfer Status Independent   Ambulation Independent   Distance Ambulation (Feet)   (household)   Assistive Devices Used Front-Wheel Walker   Stairs Required Assist   Cognition    Cognition / Consciousness WDL   Comments Pleasant and cooperative   Passive ROM Lower Body   Passive ROM Lower Body WDL   Active ROM Lower Body    Active ROM Lower Body  WDL   Strength Lower Body   Lower Body Strength  X   Gross Strength Generalized Weakness, Equal Bilaterally   Strength Upper Body   Upper Body Strength  WDL   Coordination Upper Body   Coordination WDL   Coordination Lower Body    Coordination Lower Body  WDL   Balance Assessment   Sitting Balance (Static) Good   Sitting Balance (Dynamic) Fair +   Standing Balance (Static) Fair   Standing Balance (Dynamic) Fair -   Weight Shift Sitting Good   Weight Shift Standing Fair   Comments w/ FWW   Gait Analysis   Gait Level Of Assist Supervised   Assistive Device Front Wheel Walker   Distance (Feet) 20  (to bathroom and back. Limited 2/2 pt fatigue)   # of Times Distance was Traveled 1   Deviation Bradykinetic   # of Stairs Climbed 0   Weight Bearing Status no restrictions   Comments Pt reporting slower than her baseline   Bed Mobility    Supine to Sit Supervised   Sit to Supine   (NT, up in chair post)   Scooting Supervised   Functional Mobility   Sit to Stand Supervised   Bed, Chair, Wheelchair Transfer Supervised   Transfer Method Stand Step   Mobility w/ FWW in room   How much difficulty does the patient currently have...   Turning over in bed (including  adjusting bedclothes, sheets and blankets)? 3   Sitting down on and standing up from a chair with arms (e.g., wheelchair, bedside commode, etc.) 3   Moving from lying on back to sitting on the side of the bed? 3   How much help from another person does the patient currently need...   Moving to and from a bed to a chair (including a wheelchair)? 3   Need to walk in a hospital room? 3   Climbing 3-5 steps with a railing? 3   6 clicks Mobility Score 18   Activity Tolerance   Sitting in Chair >10 min, up post   Sitting Edge of Bed 5 min   Standing 8 min   Edema / Skin Assessment   Edema / Skin  WDL   Comments colostomy intact   Patient / Family Goals    Patient / Family Goal #1 None stated   Short Term Goals    Short Term Goal # 1 Pt will ambulated 150 ft with FWW and SPV in 6 visits to return to PLOF   Short Term Goal # 2 Pt will negotiate 2 steps with BUE support and SPV in 6 visits to safely enter/exit home   Education Group   Education Provided Role of Physical Therapist   Role of Physical Therapist Patient Response Patient;Acceptance;Explanation;Action Demonstration   Problem List    Problems Decreased Activity Tolerance;Functional Strength Deficit;Impaired Bed Mobility   Anticipated Discharge Equipment and Recommendations   DC Equipment Recommendations None  (pt has needed DME)   Discharge Recommendations Anticipate that the patient will have no further physical therapy needs after discharge from the hospital   Interdisciplinary Plan of Care Collaboration   IDT Collaboration with  Nursing;Occupational Therapist   Patient Position at End of Therapy Seated;Chair Alarm On;Call Light within Reach;Tray Table within Reach;Phone within Reach   Collaboration Comments RN updated   Session Information   Date / Session Number  2/23 1 (1/3, 3/1)

## 2022-02-23 NOTE — ASSESSMENT & PLAN NOTE
Resolved  CT A/P ordered revealing no acute process. Suspect likely 2/2 increased ileostomy output

## 2022-02-23 NOTE — ASSESSMENT & PLAN NOTE
Improved  Suspected 2/2 PE. CTPE confirming PE, no signs of R heart strain. Patient reports baseline previously was 3L w/ exertion. Procal and CXR revealing no acute signs of infection. COVID negative.   - Completed BID loading dose on 2/23/22, will transition to QD on 2/24/22

## 2022-02-23 NOTE — CONSULTS
Gynecologic Oncology Consultation    Date: 2022    Requesting Physician: Dr. Angel    Consulting Physician: Yeni Maloney M.D.     Reason for consultation: Ovarian cancer    CC: Shortness of breath    HPI: Ms. Carmona is a pleasant 82 year old  female whose LMP occurred in her early fifties. She has a past medical history significant for HTN, CKD, and HLD.  She has a surgical history significant for TVH/post repair/TOT/USLS 2020 for prolapse and remote h/o open bladder suspension procedure for incontinence. She was admitted to [facility] from  - 21 for lower abominal pain, bloating and dyschezia.  CT of the abdomen and pelvis performed on 2021 revealed normal upper abdomen, thickened edematous duodenum, few loops of jejunum appear inflamed in the upper abdomen, uterus absent, pessary present, 6 centimeter cul-de-sac fluid collection.  A CEA performed on the same date was within normal limits at 2.1.  The patient was treated with Rocephin and vancomycin for UTI and presumed pelvic abscess.  On 2021, CT-guided drain was placed.  Patient reported improvement in her symptoms.  Follow-up CT abdomen pelvis on 2021 revealed trace perisplenic and perihepatic free fluid mildly increased, no lymphadenopathy, pigtail catheter in the pelvis between the rectum, sigmoid, and vagina, surrounding fluid collection resolved, no SBO, vaginal pessary in place, uterus absent, ovaries not well seen.  The pelvic drain was removed.  The patient was subsequently discharged home, however readmitted on the same date from  - 12/3/2021 for active bleeding at the drain removal site.  Hemoglobin was stable at 12 >10.  CT angio of the pelvis was negative for active bleed.  Her bleeding improved and she returned home with prescription for Bactrim and Augmentin.  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  She  subsequently followed up with her PCP Dr. Mello, at which time she learned of her biopsy results.  She was then referred to me for further evaluation.    She was seen on 1/4/22 for initial consultation. She noted that her symptoms had slowly evolved over the past year. She continued to note moderate to severe pelvic pain and bloating. Patient completed her antibiotic course; however, denied improvement in her pain. She denied weight gain, but noted that she was unable to button her pants. Her appetite was decreased. She complained of pain with bowel movements, which provoked nausea and vomiting at times. On examination, she was noted to have surgically absent uterus and cervix with nodularity over the anterior surface of the rectum and rectovaginal septal nodularity at the apex of the vagina. Given her symptoms and exam findings, I recommended surgical management. On 1/7/22, CA-125 was notably elevated at 259.  A staging CT chest was performed on 1/10/22 revealing no evidence of metastatic disease.       On 1/12/22, the patient underwent exploratory laparotomy, omentectomy, partial transverse colectomy, right hemicolectomy with primary ileocolic anastomosis, bilateral ureterolysis, upper vaginectomy, and en bloc BSO with low anterior resection, primary end to end rectosigmoid anastomosis, and placement of a diverting loop ileostomy given inability to complete a bowel prep preoperatively. 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 Patient has had a protracted postoperative course with delayed return of functional status and significant fatigue. She reported worsening shortness of breath and dyspnea on exertion, and on 2/3/22, underwent a CTA chest which revealed a RML occlusive thrombus w/ small extension to the right main pulmonary artery, no evidence of right heart strain. She was started on xarelto the same day.     Postoperatively, her CA-125 downtrended to 50. She was planned to  start chemotherapy, however initiation has been delayed to allow for recovery. She is now 6 weeks postop. Patient was seen in the office today for follow up and prechemotherapy evaluation. She is accompanied by her family today, who relayed to me today that she was admitted to [facility] last week. Review of records reveals hospitalization from 2/16 - 2/18/22 for tachycardia with hypoxia on 2L NC. She was admitted for monitoring due to increased O2 requirement, and underwent an echo which was normal w/ EF of 65%. WBC was wnl at 8. She received IVF resuscitation. She was eventually weaned off of oxygen at rest with O2 sats in to % range. A CT of the abdomen and pelvis was performed with questionable enteritis, and she was discharged home on augmentin. Her family states she felt well for ~ 2 days following discharge.     Today, she presents with complaint of worsening shortness of breath, and pulse oximeter revealed O2 sat of 73% with delayed readings despite multiple attempts with warming of the extremities and trialing different appendages. She noted significant fatigue and dyspnea on exertion. She has been using 2-3 L NC throughout the day. Her family noted pulse ox readings in the 90s at home, but decreased to mid 80s w/ exertion. She states she had been complaint with her xarelto, and has nearly completed 3 weeks of the initial BID dosing. She reports minimal appetite that improved somewhat but nowhere near baseline. No nausea/vomiting. Ileostomy output has been recorded by her caretaker and has been < 1000 cc most days. She has been drinking milkshakes when her appetite is low, but minimal fluids. Most recent albumin 4.2. She has had consistent weight loss throughout her postoperative course. She reports urinary frequency but no dysuria. Mild lower abdominal pain alleviated by tylenol. Her prechemotherapy labs were reviewed and within range for treatment, with mild elevated of her WBC to 11.8. CA-125 was 48.6.  Given her worsening respiratory symptoms with concern for hypoxia and increased O2 requirement, she was sent to [facility] by EMS for cardiopulmonary evaluation.       Review of Systems:  Constitutional: Negative for fever, chills, + weight loss, + malaise/fatigue and diaphoresis.   HENT: Negative for hearing loss, ear pain, nosebleeds, congestion, sore throat, neck pain, tinnitus and ear discharge.    Eyes: Negative for blurred vision, double vision, photophobia, pain, discharge and redness.   Respiratory: Negative for cough, hemoptysis, sputum production, + shortness of breath, wheezing and stridor.    Cardiovascular: Negative for chest pain, palpitations, orthopnea, claudication, leg swelling and PND.   Gastrointestinal: Negative for heartburn, nausea, vomiting, + abdominal pain, diarrhea, constipation, blood in stool and melena.   Genitourinary: Negative for dysuria, urgency, + frequency, hematuria and flank pain.   Musculoskeletal: Negative for myalgias, + chronic back pain, joint pain and falls.   Skin: Negative for itching and rash.  Neurological: Negative for dizziness, tingling, tremors, sensory change, speech change, focal weakness, seizures, loss of consciousness, weakness and headaches.       PMHX and PSHX per HPI      Current Facility-Administered Medications   Medication Dose Route Frequency Provider Last Rate Last Admin   • labetalol (NORMODYNE/TRANDATE) injection 10 mg  10 mg Intravenous Q4HRS PRN Dejon Martinez M.D.       • acetaminophen (Tylenol) tablet 650 mg  650 mg Oral Q6HRS PRN Dejon Martinez M.D.   650 mg at 02/23/22 0756   • rivaroxaban (XARELTO) tablet 15 mg  15 mg Oral BID WITH MEALS Dejon Martinez M.D.   15 mg at 02/23/22 0750   • lovastatin (MEVACOR) tablet 20 mg  20 mg Oral DAILY Dejon Martinez M.D.   20 mg at 02/23/22 0601   • metoprolol tartrate (LOPRESSOR) tablet 25 mg  25 mg Oral BID Dejon Martinez M.D.   25 mg at 02/23/22 0601   • multivitamin (THERAGRAN) tablet 1 Tablet  1  "Tablet Oral DAILY Dejon Martinez M.D.   1 Tablet at 02/23/22 0601   • omeprazole (PRILOSEC) capsule 20 mg  20 mg Oral DAILY Dejon Martinez M.D.   20 mg at 02/23/22 0601   • raloxifene (EVISTA) tablet 60 mg  60 mg Oral DAILY Dejon Martinez M.D.   60 mg at 02/23/22 0601   • vitamin D3 (cholecalciferol) tablet 1,000 Units  1,000 Units Oral DAILY Dejon Martinez M.D.   1,000 Units at 02/23/22 0601   • lactated ringers infusion   Intravenous Continuous Dejon Martinez M.D. 50 mL/hr at 02/22/22 2056 New Bag at 02/22/22 2056   • Pharmacy Consult Request  1 Each Other PHARMACY TO DOSE Dejon Martinez M.D.       • [START ON 2/24/2022] rivaroxaban (XARELTO) tablet 20 mg  20 mg Oral PM MEAL Dejon Martinez M.D.           Allergies:  Ciprofloxacin    Social History     Socioeconomic History   • Marital status: Single     Spouse name: Not on file   • Number of children: Not on file   • Years of education: Not on file   • Highest education level: Not on file   Occupational History   • Not on file   Tobacco Use   • Smoking status: Never Smoker   • Smokeless tobacco: Never Used   Vaping Use   • Vaping Use: Never used   Substance and Sexual Activity   • Alcohol use: Never   • Drug use: Never   • Sexual activity: Not on file   Other Topics Concern   • Not on file   Social History Narrative   • Not on file     Social Determinants of Health     Financial Resource Strain: Not on file   Food Insecurity: Not on file   Transportation Needs: Not on file   Physical Activity: Not on file   Stress: Not on file   Social Connections: Not on file   Intimate Partner Violence: Not on file   Housing Stability: Not on file       No family history of cancer    Physical Exam:  /54   Pulse 76   Temp 36.7 °C (98 °F) (Temporal)   Resp 16   Ht 1.575 m (5' 2\")   Wt 37.5 kg (82 lb 10.8 oz)   SpO2 100%     Constitutional: she is oriented to person, place, and time.  She appears thin and fatigued.   Head: Normocephalic and " atraumatic.   Neck:  Neck supple. No JVD present. No tracheal deviation present. No thyromegaly present.   Cardiovascular: Normal rate, regular rhythm, normal heart sounds and intact distal pulses, cold extremities x 4   Pulmonary/Chest: Effort normal and breath sounds normal. No stridor. No respiratory distress.   Abdominal: Soft. She exhibits no distension and no mass, non-tender, LLQ ileostomy pink and well perfused w/ enteric content  Musculoskeletal: No edema and no tenderness, + delayed cap refill  Neurological: no focal deficits  Skin: Skin is cold but dry. No rash noted.   Pelvic: Deferred given clinical condition        Labs:  Recent Labs     02/22/22  1146 02/23/22  0158   WBC 12.8* 9.4   RBC 3.63* 3.18*   HEMOGLOBIN 10.8* 9.3*   HEMATOCRIT 33.5* 29.0*   MCV 92.3 91.2   MCH 29.8 29.2   MCHC 32.2* 32.1*   RDW 57.1* 55.6*   PLATELETCT 469* 359   MPV 8.2* 8.8*     Recent Labs     02/22/22  1146 02/23/22  0158   SODIUM 132* 133*   POTASSIUM 5.0 4.6   CHLORIDE 108 109   CO2 13* 15*   GLUCOSE 117* 100*   BUN 25* 21   CREATININE 1.46* 0.89   CALCIUM 9.9 9.3         Recent Labs     02/22/22  1146 02/23/22  0158   ASTSGOT 11* 9*   ALTSGPT 5 <5   TBILIRUBIN 0.3 0.2   ALKPHOSPHAT 59 49   GLOBULIN 3.1 2.4       Radiology:  2/22/22 CXR: no acute cardiopulmonary process, hyperinflated lungs suggestive of COPD/emphysema    Assessment: This is a 82 y.o.female w/ stage IIIC HGSOC, status post optimal cytoreductive surgery, admitted with worsening SOB/TORREZ and hypoxia:     Plan:   1. SOB: concern for underlying cardiopulmonary process given significant exacerbation since recent d/c from the hospital last week w/ same, cardiopulmonary w/u to eval for CHF, cardiac dysfunction, anticoagulation failure w/ worsening PE, PNA, no pleural effusion or other underlying condition aside from COPD noted per recent CXR,  appreciate IM recs  2. Hypoxia: continue O2 supplementation and monitor demand  3. Dehydration: patient has ileostomy  w/ poor appetite, IVF resuscitation  4. H/o PE: continue xarelto, consider repeat CTA to eval for worsening PE  5. Leukocytosis: afebrile with no clinical signs of infection, suspect d/t malignancy vs. Hemoconcentration d/t dehydration  6. CKD: baseline Cr ~ 1, most recently 1.4 on 2/21, c/w IMELDA d/t volume depletion, IVF   7. Ovarian cancer: s/p cytoreductive surgery, due to begin chemotherapy, plan to initiate inpatient if neg cardiopulm w/u  8. Poor venous access: will need PICC line placement      Thank you for for allowing us to participate in this patient's care. We will continue to follow. Please feel free to contact us for any questions or if we can be of any further assistance.      Yeni Maloney MD  Gynecologic Oncology  [facility]

## 2022-02-23 NOTE — ASSESSMENT & PLAN NOTE
Improved  Patient noted to have troponin elevated at 23, w/ no acute changes noted on EKG. Suspect 2/2 demand ischemia in setting of known PE. Denies CP at this time  - Repeat EKG and trops if CP manifests or significant change in troponin

## 2022-02-23 NOTE — ED NOTES
Report to KIKA Glover. Pt to T824/01 via [facility]ley w/ RN. Pt on cardiac monitoring and O2 NC. Pt A&Ox4. All belongings are w/ pt.

## 2022-02-24 PROBLEM — N17.9 AKI (ACUTE KIDNEY INJURY) (HCC): Status: RESOLVED | Noted: 2022-02-22 | Resolved: 2022-02-22

## 2022-02-24 PROBLEM — E43 SEVERE PROTEIN-CALORIE MALNUTRITION (HCC): Status: ACTIVE | Noted: 2022-02-22

## 2022-02-24 PROBLEM — Z93.2 HIGH OUTPUT ILEOSTOMY (HCC): Status: ACTIVE | Noted: 2022-02-24

## 2022-02-24 PROBLEM — R19.8 HIGH OUTPUT ILEOSTOMY (HCC): Status: ACTIVE | Noted: 2022-02-24

## 2022-02-24 LAB
ALBUMIN SERPL BCP-MCNC: 3 G/DL (ref 3.2–4.9)
ALBUMIN/GLOB SERPL: 1.2 G/DL
ALP SERPL-CCNC: 47 U/L (ref 30–99)
ALT SERPL-CCNC: 6 U/L (ref 2–50)
ANION GAP SERPL CALC-SCNC: 10 MMOL/L (ref 7–16)
AST SERPL-CCNC: 9 U/L (ref 12–45)
BASOPHILS # BLD AUTO: 0.2 % (ref 0–1.8)
BASOPHILS # BLD: 0.02 K/UL (ref 0–0.12)
BILIRUB SERPL-MCNC: <0.2 MG/DL (ref 0.1–1.5)
BUN SERPL-MCNC: 20 MG/DL (ref 8–22)
CALCIUM SERPL-MCNC: 8.7 MG/DL (ref 8.5–10.5)
CHLORIDE SERPL-SCNC: 106 MMOL/L (ref 96–112)
CO2 SERPL-SCNC: 17 MMOL/L (ref 20–33)
CREAT SERPL-MCNC: 0.8 MG/DL (ref 0.5–1.4)
EOSINOPHIL # BLD AUTO: 0.01 K/UL (ref 0–0.51)
EOSINOPHIL NFR BLD: 0.1 % (ref 0–6.9)
ERYTHROCYTE [DISTWIDTH] IN BLOOD BY AUTOMATED COUNT: 54.3 FL (ref 35.9–50)
GLOBULIN SER CALC-MCNC: 2.6 G/DL (ref 1.9–3.5)
GLUCOSE SERPL-MCNC: 101 MG/DL (ref 65–99)
HCT VFR BLD AUTO: 27.5 % (ref 37–47)
HGB BLD-MCNC: 9.1 G/DL (ref 12–16)
IMM GRANULOCYTES # BLD AUTO: 0.03 K/UL (ref 0–0.11)
IMM GRANULOCYTES NFR BLD AUTO: 0.4 % (ref 0–0.9)
LYMPHOCYTES # BLD AUTO: 2.37 K/UL (ref 1–4.8)
LYMPHOCYTES NFR BLD: 29.6 % (ref 22–41)
MAGNESIUM SERPL-MCNC: 2.4 MG/DL (ref 1.5–2.5)
MCH RBC QN AUTO: 30.1 PG (ref 27–33)
MCHC RBC AUTO-ENTMCNC: 33.1 G/DL (ref 33.6–35)
MCV RBC AUTO: 91.1 FL (ref 81.4–97.8)
MONOCYTES # BLD AUTO: 0.99 K/UL (ref 0–0.85)
MONOCYTES NFR BLD AUTO: 12.3 % (ref 0–13.4)
NEUTROPHILS # BLD AUTO: 4.6 K/UL (ref 2–7.15)
NEUTROPHILS NFR BLD: 57.4 % (ref 44–72)
NRBC # BLD AUTO: 0 K/UL
NRBC BLD-RTO: 0 /100 WBC
PHOSPHATE SERPL-MCNC: 2.5 MG/DL (ref 2.5–4.5)
PLATELET # BLD AUTO: 390 K/UL (ref 164–446)
PMV BLD AUTO: 8.8 FL (ref 9–12.9)
POTASSIUM SERPL-SCNC: 4.2 MMOL/L (ref 3.6–5.5)
PROT SERPL-MCNC: 5.6 G/DL (ref 6–8.2)
RBC # BLD AUTO: 3.02 M/UL (ref 4.2–5.4)
SODIUM SERPL-SCNC: 133 MMOL/L (ref 135–145)
WBC # BLD AUTO: 8 K/UL (ref 4.8–10.8)

## 2022-02-24 PROCEDURE — 700102 HCHG RX REV CODE 250 W/ 637 OVERRIDE(OP): Performed by: INTERNAL MEDICINE

## 2022-02-24 PROCEDURE — 85025 COMPLETE CBC W/AUTO DIFF WBC: CPT

## 2022-02-24 PROCEDURE — 36415 COLL VENOUS BLD VENIPUNCTURE: CPT

## 2022-02-24 PROCEDURE — 700102 HCHG RX REV CODE 250 W/ 637 OVERRIDE(OP): Performed by: STUDENT IN AN ORGANIZED HEALTH CARE EDUCATION/TRAINING PROGRAM

## 2022-02-24 PROCEDURE — 83735 ASSAY OF MAGNESIUM: CPT

## 2022-02-24 PROCEDURE — 99232 SBSQ HOSP IP/OBS MODERATE 35: CPT | Performed by: INTERNAL MEDICINE

## 2022-02-24 PROCEDURE — 770020 HCHG ROOM/CARE - TELE (206)

## 2022-02-24 PROCEDURE — A9270 NON-COVERED ITEM OR SERVICE: HCPCS | Performed by: INTERNAL MEDICINE

## 2022-02-24 PROCEDURE — A9270 NON-COVERED ITEM OR SERVICE: HCPCS | Performed by: STUDENT IN AN ORGANIZED HEALTH CARE EDUCATION/TRAINING PROGRAM

## 2022-02-24 PROCEDURE — 80053 COMPREHEN METABOLIC PANEL: CPT

## 2022-02-24 PROCEDURE — 84100 ASSAY OF PHOSPHORUS: CPT

## 2022-02-24 PROCEDURE — 700105 HCHG RX REV CODE 258: Performed by: STUDENT IN AN ORGANIZED HEALTH CARE EDUCATION/TRAINING PROGRAM

## 2022-02-24 RX ORDER — LOPERAMIDE HYDROCHLORIDE 2 MG/1
2 CAPSULE ORAL 2 TIMES DAILY
Status: DISCONTINUED | OUTPATIENT
Start: 2022-02-24 | End: 2022-02-25 | Stop reason: HOSPADM

## 2022-02-24 RX ADMIN — Medication 1000 UNITS: at 04:16

## 2022-02-24 RX ADMIN — RALOXIFENE 60 MG: 60 TABLET ORAL at 04:22

## 2022-02-24 RX ADMIN — CALCIUM POLYCARBOPHIL 625 MG: 625 TABLET, FILM COATED ORAL at 12:06

## 2022-02-24 RX ADMIN — RIVAROXABAN 20 MG: 20 TABLET, FILM COATED ORAL at 17:08

## 2022-02-24 RX ADMIN — LOVASTATIN 20 MG: 20 TABLET ORAL at 04:15

## 2022-02-24 RX ADMIN — SODIUM CHLORIDE, POTASSIUM CHLORIDE, SODIUM LACTATE AND CALCIUM CHLORIDE: 600; 310; 30; 20 INJECTION, SOLUTION INTRAVENOUS at 17:08

## 2022-02-24 RX ADMIN — LOPERAMIDE HYDROCHLORIDE 2 MG: 2 CAPSULE ORAL at 08:26

## 2022-02-24 RX ADMIN — LOPERAMIDE HYDROCHLORIDE 2 MG: 2 CAPSULE ORAL at 17:08

## 2022-02-24 RX ADMIN — SODIUM BICARBONATE 650 MG: 650 TABLET ORAL at 15:24

## 2022-02-24 RX ADMIN — ACETAMINOPHEN 650 MG: 325 TABLET, FILM COATED ORAL at 18:35

## 2022-02-24 RX ADMIN — METOPROLOL TARTRATE 25 MG: 25 TABLET, FILM COATED ORAL at 17:08

## 2022-02-24 RX ADMIN — SODIUM BICARBONATE 650 MG: 650 TABLET ORAL at 08:26

## 2022-02-24 RX ADMIN — METOPROLOL TARTRATE 25 MG: 25 TABLET, FILM COATED ORAL at 04:15

## 2022-02-24 RX ADMIN — CALCIUM POLYCARBOPHIL 625 MG: 625 TABLET, FILM COATED ORAL at 08:26

## 2022-02-24 RX ADMIN — SODIUM BICARBONATE 650 MG: 650 TABLET ORAL at 21:09

## 2022-02-24 RX ADMIN — CALCIUM POLYCARBOPHIL 625 MG: 625 TABLET, FILM COATED ORAL at 17:08

## 2022-02-24 RX ADMIN — THERA TABS 1 TABLET: TAB at 04:16

## 2022-02-24 RX ADMIN — OMEPRAZOLE 20 MG: 20 CAPSULE, DELAYED RELEASE ORAL at 04:16

## 2022-02-24 ASSESSMENT — PAIN DESCRIPTION - PAIN TYPE
TYPE: ACUTE PAIN

## 2022-02-24 ASSESSMENT — FIBROSIS 4 INDEX: FIB4 SCORE: 0.78

## 2022-02-24 NOTE — DIETARY
"Nutrition services: Day 1 of admit.  Vira Carmona is a 82 y.o. female with admitting DX of acute respiratory failure with hypoxia  Consult received for failure to thrive, BMI <19, MST 3    Met with pt at bedside. Pt reports she has lost weight d/t cancer and since she had surgery on January 4th. UBW is 98 lbs. Pt states she has no appetite and has been eating less food. She reports she has an increase in appetite since being admitted to the hospital. We discussed adding supplements to her meals to optimize PO intake and pt agreed to try a Chobani smoothie at breakfast and Magic Cups with lunch and dinner. Pt states she does not like Boost supplements.     Assessment:  Height: 157.5 cm (5' 2\")  Weight: 37.5 kg (82 lb 10.8 oz)- stand up scale  Body mass index is 15.12 kg/m²., BMI classification: Underweight  Diet/Intake: Regular Diet    Evaluation:   1. Pt admitted for acute respiratory failure with hypoxia, abdominal pain, malnutrition, elevated troponin, acute kidney injury, metabolic acidosis, ovarian cancer, PE  2. Per MD note, concern for refeeding syndrome  3. Limited information in ADL's to review PO intake  4. Based on pt's reported usual body weight, pt has a 15.6% weight loss in < 3 months which is severe.   5. Nutrition Focused Physical Exam: moderate muscle wasting (clavicles)  6. Labs: sodium 133, glucose 100  7. Meds: Fibercon, MVI, omeprazole, sodium bicarbonate, vitamin D3, lactated ringers infusion  8. Skin: colostomy present, last BM 2/23 (green, watery)    Malnutrition Risk: Pt with moderate malnutrition in the context of chronic disease related ovarian cancer as evidenced by pt reported weight loss of 15.6% in < 3 months which is severe and upon visual exam pt noted with moderate muscle wasting (clavicles).     Recommendations/Plan:  1. Continue PO diet  2. Magic Cups BID  3. Chobani Smoothie 1x/d  4. Encourage intake of meals  5. Document intake of all meals  as % taken in ADL's to provide " interdisciplinary communication across all shifts.   6. Monitor weight.  7. Monitor for refeeding: Order BMP w/ Mg and Phos x 7 days. Replete K, Phos and Mg prn. Supplement 100 mg Thiamine x 7 days to reduce risk of refeeding. Communicated to MD.   8. Nutrition rep will continue to see patient for ongoing meal and snack preferences.     RD following.

## 2022-02-24 NOTE — CARE PLAN
The patient is Stable - Low risk of patient condition declining or worsening    Shift Goals  Clinical Goals: PICC line  Patient Goals: Comfort/Mobilize      Problem: Knowledge Deficit - Standard  Goal: Patient and family/care givers will demonstrate understanding of plan of care, disease process/condition, diagnostic tests and medications  Outcome: Progressing  Note: Pt educated on POC, verbalizes understanding.      Problem: Skin Care - Ostomy  Goal: Skin remains free from irritation  Outcome: Progressing  Note: Skin around ostomy assessed, pink & beefy in appearance. No leakage present, or irritation around ostomy.        Progress made toward(s) clinical / shift goals:  progressing

## 2022-02-24 NOTE — CARE PLAN
The patient is Stable - Low risk of patient condition declining or worsening    Shift Goals  Clinical Goals: Adequate sleep  Patient Goals: Rest    Progress made toward(s) clinical / shift goals:  progressing       Problem: Knowledge Deficit - Standard  Goal: Patient and family/care givers will demonstrate understanding of plan of care, disease process/condition, diagnostic tests and medications  Outcome: Progressing  Note: Discussed POC with patient.  Patient educated about sodium bicarbonate.  All questions answered.      Problem: Pain - Standard  Goal: Alleviation of pain or a reduction in pain to the patient’s comfort goal  Outcome: Progressing  Note: Patient c/o headache 3/10 pain.  Medicated per MAR.  Patient now sleeping comfortably.

## 2022-02-24 NOTE — PROGRESS NOTES
Assumed care of patient. Bedside report received from Ana MASON RN. Updated POC, call light within reach, fall precautions in place. Bed locked, and in lowest position. Pt is A&O x4. Patient instructed to call for assistance. All questions answered, no further needs at this time.

## 2022-02-24 NOTE — ASSESSMENT & PLAN NOTE
Patient reporting increasing output in ileostomy over past couple of weeks with need for several changes throughout the day.  - Will initiate scheduled FiberCon and imodium this admission

## 2022-02-24 NOTE — DOCUMENTATION QUERY
[facility]                                                                       Query Response Note      PATIENT:               ERIC LEHMAN  ACCT #:                  [id number]  MRN:                     [id number]  :                      1940  ADMIT DATE:       2022 11:14 AM  DISCH DATE:          RESPONDING  PROVIDER #:        [id number]           QUERY TEXT:    Malnutrition is documented in the Medical Record.  Please specify the severity.    NOTE:  If an appropriate response is not listed below, please respond with a new note.    The patient's Clinical Indicators include:  Per Progress Notes: malnutrition, BMI 14.27.  H/o lack of appetite for last several weeks, suspect 2/2 underlying ovarian / colon CA.  Concern for refeeding syndrome  Per  Dietary Eval: pt with moderate malnutrition in the context of chronic disease related ovarian cancer as evidenced by 15.6% weight loss in < 3 months , which is severe. moderate muscle wasting (clavicles)  Risk Factors: poor appetite, ovarian cancer, severe wt loss, moderate muscle wasting  Treatment: monitor daily labs, replete as needed, dietary eval, Magic Cups BID, encourage/ document PO intake, monitor weight, monitor for refeeding, BMP w/ Mg and Phos x 7 days, replete K, Phos, Mg PRN , Thiamine x 7 days     Thank you,  Meng Anton RN, BSN  Clinical   Connect via Edevate  Options provided:   -- Mild protein calorie malnutrition   -- Moderate protein calorie malnutrition   -- Unable to determine      Query created by: Meng Anton on 2022 10:01 AM    RESPONSE TEXT:    Severe protein calorie malnutrition complicating advanced stage ovarian cancer       QUERY TEXT:    Chronic Kidney Disease (CKD) is documented in the Medical Record.  Please specify the disease stage    Stages are defined by the [employer] as follows:  CKD Stage  I  GFR >= 90 ml / min per 1.73 m2 and persistent albuminuria  CKD Stage 2 GFR between 60 and 89 with persistent albuminuria  CKD Stage 3a GFR between 45 - 59  CKD Stage 3b GFR between 30-44  CKD Stage 4 GFR between 15 and 29   CKD Stage 5 GFR between <15 or End Stage Renal Disease    The patient's Clinical Indicators include:  Per Progress Notes: IMELDA superimposed on CKD .  Pt with h/o CKD , baseline creatine 1/0  2/22 GFR 34, 2/23 GFR >60, 2/24 GFR > 60  2/22 Urinalysis: Protein 30   Risk Factors: age  Treatment: ctm, avoid nephrotoxic agents as able     Thank you,   Meng Anton RN, BSN  Clinical   Connect via MDCapsule  Options provided:   -- Chronic kidney disease Stage 1   -- Chronic kidney disease Stage 2   -- Other stage of CKD, - please specify   -- CKD ruled out   -- Unable to determine      Query created by: Meng Anton on 2/24/2022 10:10 AM    RESPONSE TEXT:    CKD ruled out          Electronically signed by:  KENNETH RUSSELL MD 2/24/2022 11:30 AM

## 2022-02-24 NOTE — PROGRESS NOTES
Daily Progress Note:     Date of Service: 2/24/2022  Primary Team: [facility] IM White Team   Attending: Fabio Cuenca M.D.   Senior Resident: Dr. Mahogany Gould  Intern: Dr. Martinez  Contact:  [phone]    Chief Complaint/ID:   82 y.o. female who presented 2/22/2022 with PMH of HTN, CKD, HLD, ovarian cancer, PE, who presented to our facility at the request of her OP oncologist due to worsening O2 demands.     Subjective:   No acute events overnight. Patient now off O2 and reports no CP or SOB at this time. Patient reports continued increased frequency of her ileostomy output and unfortunately had one bag overfill and leak on her overnight. Patient does otherwise report feeling well and has no concerns at this time.     Consultants/Specialty:  Gyn-onc    Review of Systems:    Review of Systems   Constitutional: Negative for chills and fever.   HENT: Negative for hearing loss and sore throat.    Eyes: Negative for blurred vision and double vision.   Respiratory: Negative for cough and shortness of breath.    Cardiovascular: Negative for chest pain and palpitations.   Gastrointestinal: Positive for diarrhea. Negative for constipation.   Musculoskeletal: Negative for back pain and myalgias.   Neurological: Negative for tingling and headaches.       Objective Data:   Physical Exam:   Vitals:   Temp:  [36.2 °C (97.1 °F)-37.3 °C (99.1 °F)] 37.3 °C (99.1 °F)  Pulse:  [] 81  Resp:  [16] 16  BP: (108-130)/(55-65) 120/62  SpO2:  [95 %-100 %] 97 %     Physical Exam  Constitutional:       Comments: Cachetic appearance   HENT:      Head: Normocephalic and atraumatic.      Mouth/Throat:      Mouth: Mucous membranes are moist.      Pharynx: Oropharynx is clear.      Comments: Yellowing noted to lingual frenulum   Eyes:      Pupils: Pupils are equal, round, and reactive to light.   Cardiovascular:      Rate and Rhythm: Normal rate and regular rhythm.      Pulses: Normal pulses.      Heart sounds: Normal heart sounds. No murmur  heard.    No friction rub. No gallop.   Pulmonary:      Effort: Pulmonary effort is normal. No respiratory distress.      Breath sounds: Normal breath sounds. No stridor. No wheezing or rhonchi.   Abdominal:      General: Abdomen is flat.      Palpations: Abdomen is soft.      Tenderness: No TTP     Comments: Ileostomy in place w/ liquid brown stool noted. Stoma pink, well perfused   Musculoskeletal:         General: Deformity (RA deviation to PIP joints b/l) present. Normal range of motion.   Skin:     General: Skin is warm and dry.      Capillary Refill: Capillary refill takes less than 2 seconds.    Neurological:      General: No focal deficit present.      Mental Status: She is alert and oriented to person, place, and time.   Psychiatric:         Mood and Affect: Mood normal.         Behavior: Behavior normal.           Labs:   Recent Labs     02/22/22  1146 02/22/22  1735 02/23/22  0158 02/24/22  0148   SODIUM 132*  --  133* 133*   POTASSIUM 5.0  --  4.6 4.2   CHLORIDE 108  --  109 106   CO2 13*  --  15* 17*   BUN 25*  --  21 20   CREATININE 1.46*  --  0.89 0.80   MAGNESIUM 1.7  --  1.5 2.4   PHOSPHORUS  --  3.2 3.1 2.5   CALCIUM 9.9  --  9.3 8.7       Recent Labs     02/22/22  1146 02/22/22  1438 02/23/22  0158 02/24/22  0148   ALTSGPT 5  --  <5 6   ASTSGOT 11*  --  9* 9*   ALKPHOSPHAT 59  --  49 47   TBILIRUBIN 0.3  --  0.2 <0.2   PREALBUMIN  --  27.9  --   --    GLUCOSE 117*  --  100* 101*       Recent Labs     02/22/22  1146 02/23/22  0158 02/24/22  0148   RBC 3.63* 3.18* 3.02*   HEMOGLOBIN 10.8* 9.3* 9.1*   HEMATOCRIT 33.5* 29.0* 27.5*   PLATELETCT 469* 359 390   FERRITIN  --  135.0  --        Recent Labs     02/22/22  1146 02/23/22  0158 02/24/22  0148   WBC 12.8* 9.4 8.0   NEUTSPOLYS 75.90*  --  57.40   LYMPHOCYTES 12.90*  --  29.60   MONOCYTES 10.50  --  12.30   EOSINOPHILS 0.00  --  0.10   BASOPHILS 0.20  --  0.20   ASTSGOT 11* 9* 9*   ALTSGPT 5 <5 6   ALKPHOSPHAT 59 49 47   TBILIRUBIN 0.3 0.2 <0.2        Imaging:   EC-ECHOCARDIOGRAM COMPLETE W/O CONT   Final Result      CT-CTA CHEST PULMONARY ARTERY W/ RECONS   Final Result         1.  Right middle lobe subsegmental pulmonary embolus, no radiographic evidence of right heart strain is appreciated   2.  Atherosclerosis.      These findings were discussed with the patient's clinician, Dr. Garcia, on 2/22/2022 9:00 PM.      CT-ABDOMEN-PELVIS WITH   Final Result         1.      DX-CHEST-PORTABLE (1 VIEW)   Final Result      1.  There is no acute cardiopulmonary process.   2.  Lungs are hyperinflated suggestive of COPD or emphysematous change.      IR-PICC LINE PLACEMENT W/ GUIDANCE > AGE 5    (Results Pending)       Problem Representation:     * Acute respiratory failure with hypoxia (HCC)- (present on admission)  Assessment & Plan  Improved  Suspected 2/2 PE. CTPE confirming PE, no signs of R heart strain. Patient reports baseline previously was 3L w/ exertion. Procal and CXR revealing no acute signs of infection. COVID negative.   - Completed BID loading dose on 2/23/22, will transition to QD on 2/24/22        High output ileostomy (HCC)  Assessment & Plan  Patient reporting increasing output in ileostomy over past couple of weeks with need for several changes throughout the day.  - Will initiate scheduled FiberCon and imodium this admission     Pulmonary embolism (HCC) -- subacute - (present on admission)  Assessment & Plan  Patient w/ reported history of PE diagnosed early Feb 22 at OSH. Currently taking xarelto 15 mg BID  - Will confirm duration of patients xarelto and cont inpatient  - Echo ordered, will f/u results    Ovarian ca (HCC)  Assessment & Plan  Patient w/ h/o ovarian CA  - OP oncologist is aware of patient, will f/u while here in hospital, appreciate recs  - Cont raloxifene   - Likely initiate chemotherapy in inpatient setting, order for PICC line placed    Metabolic acidosis with normal anion gap and bicarbonate losses  Assessment &  Plan  Improved  Suspect metabolic acidosis likely 2/2 diarrhea in setting of increased ostomy output. ABG confirms adequate compensation at this time  - Will cont maintenance fluids  - Will initiate bicarbonate on this admission        Elevated troponin  Assessment & Plan  Improved  Patient noted to have troponin elevated at 23, w/ no acute changes noted on EKG. Suspect 2/2 demand ischemia in setting of known PE. Denies CP at this time  - Repeat EKG and trops if CP manifests or significant change in troponin    Severe protein-calorie malnutrition (HCC)  Assessment & Plan  Patient noted to have BMI 14.27 and reported h/o lack of appetite for the past several weeks. Suspect 2/2 underlying ovarian/colon CA.  - Concern for refeeding syndrome, will monitor daily labs and replete as needed  - Nutrition consulted, appreciate recs      AP (abdominal pain)  Assessment & Plan  Resolved  CT A/P ordered revealing no acute process. Suspect likely 2/2 increased ileostomy output

## 2022-02-25 ENCOUNTER — APPOINTMENT (OUTPATIENT)
Dept: RADIOLOGY | Facility: REHABILITATION | Age: 82
DRG: 175 | End: 2022-02-25
Attending: STUDENT IN AN ORGANIZED HEALTH CARE EDUCATION/TRAINING PROGRAM
Payer: MEDICARE

## 2022-02-25 ENCOUNTER — APPOINTMENT (OUTPATIENT)
Dept: RADIOLOGY | Facility: MEDICAL CENTER | Age: 82
DRG: 175 | End: 2022-02-25
Attending: STUDENT IN AN ORGANIZED HEALTH CARE EDUCATION/TRAINING PROGRAM
Payer: MEDICARE

## 2022-02-25 ENCOUNTER — PHARMACY VISIT (OUTPATIENT)
Dept: PHARMACY | Facility: MEDICAL CENTER | Age: 82
End: 2022-02-25
Payer: COMMERCIAL

## 2022-02-25 VITALS
TEMPERATURE: 98.2 F | HEIGHT: 62 IN | BODY MASS INDEX: 14.73 KG/M2 | WEIGHT: 80.03 LBS | DIASTOLIC BLOOD PRESSURE: 68 MMHG | RESPIRATION RATE: 16 BRPM | HEART RATE: 95 BPM | OXYGEN SATURATION: 95 % | SYSTOLIC BLOOD PRESSURE: 126 MMHG

## 2022-02-25 LAB
ALBUMIN SERPL BCP-MCNC: 2.9 G/DL (ref 3.2–4.9)
ALBUMIN/GLOB SERPL: 1 G/DL
ALP SERPL-CCNC: 50 U/L (ref 30–99)
ALT SERPL-CCNC: <5 U/L (ref 2–50)
ANION GAP SERPL CALC-SCNC: 10 MMOL/L (ref 7–16)
AST SERPL-CCNC: 17 U/L (ref 12–45)
BILIRUB SERPL-MCNC: <0.2 MG/DL (ref 0.1–1.5)
BUN SERPL-MCNC: 20 MG/DL (ref 8–22)
CALCIUM SERPL-MCNC: 9 MG/DL (ref 8.5–10.5)
CHLORIDE SERPL-SCNC: 111 MMOL/L (ref 96–112)
CO2 SERPL-SCNC: 15 MMOL/L (ref 20–33)
CREAT SERPL-MCNC: 0.79 MG/DL (ref 0.5–1.4)
ERYTHROCYTE [DISTWIDTH] IN BLOOD BY AUTOMATED COUNT: 59.8 FL (ref 35.9–50)
GLOBULIN SER CALC-MCNC: 2.8 G/DL (ref 1.9–3.5)
GLUCOSE SERPL-MCNC: 104 MG/DL (ref 65–99)
HCT VFR BLD AUTO: 30.7 % (ref 37–47)
HGB BLD-MCNC: 9.2 G/DL (ref 12–16)
LACTOFERRIN STL QL IA: NEGATIVE
MCH RBC QN AUTO: 29 PG (ref 27–33)
MCHC RBC AUTO-ENTMCNC: 30 G/DL (ref 33.6–35)
MCV RBC AUTO: 96.8 FL (ref 81.4–97.8)
PLATELET # BLD AUTO: 363 K/UL (ref 164–446)
PMV BLD AUTO: 8.5 FL (ref 9–12.9)
POTASSIUM SERPL-SCNC: 4.7 MMOL/L (ref 3.6–5.5)
PROT SERPL-MCNC: 5.7 G/DL (ref 6–8.2)
RBC # BLD AUTO: 3.17 M/UL (ref 4.2–5.4)
SODIUM SERPL-SCNC: 136 MMOL/L (ref 135–145)
WBC # BLD AUTO: 7.6 K/UL (ref 4.8–10.8)

## 2022-02-25 PROCEDURE — 700105 HCHG RX REV CODE 258: Performed by: STUDENT IN AN ORGANIZED HEALTH CARE EDUCATION/TRAINING PROGRAM

## 2022-02-25 PROCEDURE — 99238 HOSP IP/OBS DSCHRG MGMT 30/<: CPT | Performed by: INTERNAL MEDICINE

## 2022-02-25 PROCEDURE — 700102 HCHG RX REV CODE 250 W/ 637 OVERRIDE(OP): Performed by: STUDENT IN AN ORGANIZED HEALTH CARE EDUCATION/TRAINING PROGRAM

## 2022-02-25 PROCEDURE — 36573 INSJ PICC RS&I 5 YR+: CPT

## 2022-02-25 PROCEDURE — 36415 COLL VENOUS BLD VENIPUNCTURE: CPT

## 2022-02-25 PROCEDURE — 85027 COMPLETE CBC AUTOMATED: CPT

## 2022-02-25 PROCEDURE — 700102 HCHG RX REV CODE 250 W/ 637 OVERRIDE(OP): Performed by: INTERNAL MEDICINE

## 2022-02-25 PROCEDURE — A9270 NON-COVERED ITEM OR SERVICE: HCPCS | Performed by: INTERNAL MEDICINE

## 2022-02-25 PROCEDURE — A9270 NON-COVERED ITEM OR SERVICE: HCPCS | Performed by: STUDENT IN AN ORGANIZED HEALTH CARE EDUCATION/TRAINING PROGRAM

## 2022-02-25 PROCEDURE — 80053 COMPREHEN METABOLIC PANEL: CPT

## 2022-02-25 PROCEDURE — RXMED WILLOW AMBULATORY MEDICATION CHARGE: Performed by: STUDENT IN AN ORGANIZED HEALTH CARE EDUCATION/TRAINING PROGRAM

## 2022-02-25 PROCEDURE — 71045 X-RAY EXAM CHEST 1 VIEW: CPT

## 2022-02-25 RX ORDER — SODIUM BICARBONATE 650 MG/1
1300 TABLET ORAL 3 TIMES DAILY
Status: DISCONTINUED | OUTPATIENT
Start: 2022-02-25 | End: 2022-02-25 | Stop reason: HOSPADM

## 2022-02-25 RX ORDER — LOPERAMIDE HYDROCHLORIDE 2 MG/1
2 CAPSULE ORAL 2 TIMES DAILY
Qty: 30 CAPSULE | Refills: 2 | Status: SHIPPED | OUTPATIENT
Start: 2022-02-25

## 2022-02-25 RX ORDER — SODIUM BICARBONATE 650 MG/1
1300 TABLET ORAL 3 TIMES DAILY
Qty: 120 TABLET | Refills: 3 | Status: SHIPPED | OUTPATIENT
Start: 2022-02-25

## 2022-02-25 RX ORDER — CALCIUM POLYCARBOPHIL 625 MG
625 TABLET ORAL
Qty: 30 TABLET | Refills: 2 | Status: SHIPPED | OUTPATIENT
Start: 2022-02-25

## 2022-02-25 RX ORDER — LANOLIN ALCOHOL/MO/W.PET/CERES
100 CREAM (GRAM) TOPICAL DAILY
Qty: 30 TABLET | Refills: 0 | Status: SHIPPED | OUTPATIENT
Start: 2022-02-26

## 2022-02-25 RX ADMIN — Medication 1000 UNITS: at 04:16

## 2022-02-25 RX ADMIN — METOPROLOL TARTRATE 25 MG: 25 TABLET, FILM COATED ORAL at 04:16

## 2022-02-25 RX ADMIN — ACETAMINOPHEN 650 MG: 325 TABLET, FILM COATED ORAL at 08:22

## 2022-02-25 RX ADMIN — SODIUM BICARBONATE 1300 MG: 650 TABLET ORAL at 08:21

## 2022-02-25 RX ADMIN — THERA TABS 1 TABLET: TAB at 04:16

## 2022-02-25 RX ADMIN — LOVASTATIN 20 MG: 20 TABLET ORAL at 04:16

## 2022-02-25 RX ADMIN — LOPERAMIDE HYDROCHLORIDE 2 MG: 2 CAPSULE ORAL at 04:16

## 2022-02-25 RX ADMIN — SODIUM CHLORIDE, POTASSIUM CHLORIDE, SODIUM LACTATE AND CALCIUM CHLORIDE: 600; 310; 30; 20 INJECTION, SOLUTION INTRAVENOUS at 09:36

## 2022-02-25 RX ADMIN — CALCIUM POLYCARBOPHIL 625 MG: 625 TABLET, FILM COATED ORAL at 11:43

## 2022-02-25 RX ADMIN — CALCIUM POLYCARBOPHIL 625 MG: 625 TABLET, FILM COATED ORAL at 08:21

## 2022-02-25 RX ADMIN — RALOXIFENE 60 MG: 60 TABLET ORAL at 04:16

## 2022-02-25 RX ADMIN — OMEPRAZOLE 20 MG: 20 CAPSULE, DELAYED RELEASE ORAL at 04:16

## 2022-02-25 RX ADMIN — Medication 100 MG: at 04:16

## 2022-02-25 ASSESSMENT — ENCOUNTER SYMPTOMS
VOMITING: 0
FEVER: 0
COUGH: 0
SHORTNESS OF BREATH: 0
ABDOMINAL PAIN: 0
DIARRHEA: 1
CHILLS: 0
NAUSEA: 0

## 2022-02-25 ASSESSMENT — PAIN DESCRIPTION - PAIN TYPE: TYPE: ACUTE PAIN

## 2022-02-25 NOTE — DISCHARGE INSTRUCTIONS
"  PICC Line Instructions    How to Care for your PICC  • Do not take a bath, swim, or use hot tubs when you have a PICC. Cover PICC line with clear plastic wrap and tape to keep it dry while showering  • Check the PICC insertion site daily for leakage, redness, swelling, or pain.  • Flush the PICC as directed by your health care provider. Let your health care provider know right away if the PICC is difficult to flush or does not flush. Do not use force to flush the PICC.  • Do not use a syringe that is less than 10 mL to flush the PICC  • Avoid blood pressure checks on the arm with the PICC.  • Do not take the PICC out yourself. Only a trained clinical professional should remove the PICC  • Make sure you or anyone who access your PICC Line washes their hands before using the line  • Make sure the hub of the line is \"scrubbed\" prior to using the line  Dressing Changes  • Change the PICC dressing as instructed by your health care provider.  • Change your PICC dressing if it becomes loose or wet.  When to seek medical attention  • PICC is accidentally pulled all the way out  • There is any type of drainage, redness, or swelling where the PICC enters the skin.  • You cannot flush the PICC, it is difficult to flush, or the PICC leaks around the insertion site when it is flushed.  • You hear a \"flushing\" sound when the PICC is flushed.  • You notice a hole or tear in the PICC.  • You develop chills or a fever      PICC Home Care Guide    A peripherally inserted central catheter (PICC) is a form of IV access that allows medicines and IV fluids to be quickly distributed throughout the body. The PICC is a long, thin, flexible tube (catheter) that is inserted into a vein in the upper arm. The catheter ends in a large vein in the chest (superior vena cava, or SVC). After the PICC is inserted, a chest X-ray may be done to make sure that it is in the correct place.  A PICC may be placed for different reasons, such as:  · To give " medicines and liquid nutrition.  · To give IV fluids and blood products.  · If there is trouble placing a peripheral intravenous (PIV) catheter.  If taken care of properly, a PICC can remain in place for several months. Having a PICC can also allow a person to go home from the hospital sooner. Medicine and PICC care can be managed at home by a family member, caregiver, or home health care team.  What are the risks?  Generally, having a PICC is safe. However, problems may occur, including:  · A blood clot (thrombus) forming in or at the tip of the PICC.  · A blood clot forming in a vein (deep vein thrombosis) or traveling to the lung (pulmonary embolism).  · Inflammation of the vein (phlebitis) in which the PICC is placed.  · Infection. Central line associated blood stream infection (CLABSI) is a serious infection that often requires hospitalization.  · PICC movement (malposition). The PICC tip may move from its original position due to excessive physical activity, forceful coughing, sneezing, or vomiting.  · A break or cut in the PICC. It is important not to use scissors near the PICC.  · Nerve or tendon irritation or injury during PICC insertion.  How to take care of your PICC  Preventing problems  · You and any caregivers should wash your hands often with soap. Wash hands:  ? Before touching the PICC line or the infusion device.  ? Before changing a bandage (dressing).  · Flush the PICC as told by your health care provider. Let your health care provider know right away if the PICC is hard to flush or does not flush. Do not use force to flush the PICC.  · Do not use a syringe that is less than 10 mL to flush the PICC.  · Avoid blood pressure checks on the arm in which the PICC is placed.  · Never pull or tug on the PICC.  · Do not take the PICC out yourself. Only a trained clinical professional should remove the PICC.  · Use clean and sterile supplies only. Keep the supplies in a dry place. Do not reuse needles,  syringes, or any other supplies. Doing that can lead to infection.  · Keep pets and children away from your PICC line.  · Check the PICC insertion site every day for signs of infection. Check for:  ? Leakage.  ? Redness, swelling, or pain.  ? Fluid or blood.  ? Warmth.  ? Pus or a bad smell.  PICC dressing care  · Keep your PICC bandage (dressing) clean and dry to prevent infection.  · Do not take baths, swim, or use a hot tub until your health care provider approves. Ask your health care provider if you can take showers. You may only be allowed to take sponge baths for bathing. When you are allowed to shower:  ? Ask your health care provider to teach you how to wrap the PICC line.  ? Cover the PICC line with clear plastic wrap and tape to keep it dry while showering.  · Follow instructions from your health care provider about how to take care of your insertion site and dressing. Make sure you:  ? Wash your hands with soap and water before you change your bandage (dressing). If soap and water are not available, use hand .  ? Change your dressing as told by your health care provider.  ? Leave stitches (sutures), skin glue, or adhesive strips in place. These skin closures may need to stay in place for 2 weeks or longer. If adhesive strip edges start to loosen and curl up, you may trim the loose edges. Do not remove adhesive strips completely unless your health care provider tells you to do that.  · Change your PICC dressing if it becomes loose or wet.  General instructions    · Carry your PICC identification card or wear a medical alert bracelet at all times.  · Keep the tube clamped at all times, unless it is being used.  · Carry a smooth-edge clamp with you at all times to place on the tube if it breaks.  · Do not use scissors or sharp objects near the tube.  · You may bend your arm and move it freely. If your PICC is near or at the bend of your elbow, avoid activity with repeated motion at the  "elbow.  · Avoid lifting heavy objects as told by your health care provider.  · Keep all follow-up visits as told by your health care provider. This is important.  Disposal of supplies  · Throw away any syringes in a disposal container that is meant for sharp items (sharps container). You can buy a sharps container from a pharmacy, or you can make one by using an empty hard plastic bottle with a cover.  · Place any used dressings or infusion bags into a plastic bag. Throw that bag in the trash.  Contact a health care provider if:  · You have pain in your arm, ear, face, or teeth.  · You have a fever or chills.  · You have redness, swelling, or pain around the insertion site.  · You have fluid or blood coming from the insertion site.  · Your insertion site feels warm to the touch.  · You have pus or a bad smell coming from the insertion site.  · Your skin feels hard and raised around the insertion site.  Get help right away if:  · Your PICC is accidentally pulled all the way out. If this happens, cover the insertion site with a bandage or gauze dressing. Do not throw the PICC away. Your health care provider will need to check it.  · Your PICC was tugged or pulled and has partially come out. Do not  push the PICC back in.  · You cannot flush the PICC, it is hard to flush, or the PICC leaks around the insertion site when it is flushed.  · You hear a \"flushing\" sound when the PICC is flushed.  · You feel your heart racing or skipping beats.  · There is a hole or tear in the PICC.  · You have swelling in the arm in which the PICC was inserted.  · You have a red streak going up your arm from where the PICC was inserted.  Summary  · A peripherally inserted central catheter (PICC) is a long, thin, flexible tube (catheter) that is inserted into a vein in the upper arm.  · The PICC is inserted using a sterile technique by a specially trained nurse or physician. Only a trained clinical professional should remove it.  · Keep " your PICC identification card with you at all times.  · Avoid blood pressure checks on the arm in which the PICC is placed.  · If cared for properly, a PICC can remain in place for several months. Having a PICC can also allow a person to go home from the hospital sooner.  This information is not intended to replace advice given to you by your health care provider. Make sure you discuss any questions you have with your health care provider.  Document Released: 06/23/2004 Document Revised: 11/30/2018 Document Reviewed: 01/20/2018  [employer] Patient Education © 2020 [employer]    PICC Insertion, Care After    This sheet gives you information about how to care for yourself after your procedure. Your health care provider may also give you more specific instructions. If you have problems or questions, contact your health care provider.  What can I expect after the procedure?  After your procedure, it is common to have mild discomfort at the insertion site.  Follow these instructions at home:  Activity  · Rest as told by your health care provider. Ask your health care provider when you can return to your normal activities.  · If your PICC is near or at the bend of your elbow, avoid activity with repeated motion at the elbow.  · Do not lift anything that is heavier than 10 lb (4.5 kg), or the limit that you are told, until your health care provider says that it is safe.  · Avoid using a crutch with the arm on the same side as your PICC. You may need to use a walker.  · Avoid any activity that requires great effort as told by your health care provider.  Bathing  · Do not take baths, swim, or use a hot tub until your health care provider approves. Ask your health care provider if you can take showers. You may only be allowed to take sponge baths for bathing.  · Keep the bandage (dressing) dry until your health care provider says it can be removed.  General instructions  · Do not drive for 24 hours if you were given a  "medicine to help you relax (sedative).  · Take over-the-counter and prescription medicines only as told by your health care provider.  · Keep all follow-up visits as told by your health care provider. This is important.  Contact a health care provider if:  · You have a fever or chills.  · You have more redness, swelling, or pain around the insertion site.  · You have fluid or blood coming from the insertion site.  · Your insertion site feels warm to the touch.  · You have pus or a bad smell coming from the insertion site.  · Your vein feels hard and raised like a \"cord\" under the skin around the insertion site.  Get help right away if:  · You have swelling in the arm in which the PICC was inserted.  · You have numbness or tingling in your fingers, hand, or arm.  · You have a red streak going up your arm from where the PICC was inserted.  · Your PICC cannot be flushed, is hard to flush, or leaks around the insertion site when flushed.  · You have pain in your arm, ear, face, or teeth.  · Your PICC is accidentally pulled all the way out. If this happens, cover the insertion site with a bandage or gauze dressing. Do not throw the PICC away. Your health care provider will need to check it.  · Your PICC was tugged or pulled and has partially come out. Do not push the PICC back in.  · You hear a \"flushing\" sound when the PICC is flushed.  · You feel your heart racing or skipping beats.  · There is a hole or tear in the PICC.  · You have pain in your chest.  · You have shortness of breath or difficulty breathing.  Summary  · After your procedure, it is common to have mild discomfort at the insertion site.  · Do not lift anything that is heavier than 10 lb (4.5 kg), or the limit that you are told, until your health care provider says that it is safe.  · Flush the PICC as told by your health care provider. Let your health care provider know right away if the PICC is hard to flush or does not flush. Do not use force to flush " the PICC.  · Check your insertion site every day for signs of infection.  This information is not intended to replace advice given to you by your health care provider. Make sure you discuss any questions you have with your health care provider.  Document Released: 10/08/2014 Document Revised: 11/30/2018 Document Reviewed: 02/12/2018  [employer] Patient Education © 2020 [employer]    Discharge Instructions    Discharged to home by car with relative. Discharged via wheelchair, hospital escort: Yes.  Special equipment needed: Not Applicable    Be sure to schedule a follow-up appointment with your primary care doctor or any specialists as instructed.     Discharge Plan:   Diet Plan: Discussed  Activity Level: Discussed  Confirmed Follow up Appointment: Patient to Call and Schedule Appointment  Confirmed Symptoms Management: Discussed  Influenza Vaccine Indication: Not indicated: Previously immunized this influenza season and > 8 years of age    I understand that a diet low in cholesterol, fat, and sodium is recommended for good health. Unless I have been given specific instructions below for another diet, I accept this instruction as my diet prescription.   Other diet:     Special Instructions: None    · Is patient discharged on Warfarin / Coumadin?   No     Depression / Suicide Risk    As you are discharged from this [facility] facility, it is important to learn how to keep safe from harming yourself.    Recognize the warning signs:  · Abrupt changes in personality, positive or negative- including increase in energy   · Giving away possessions  · Change in eating patterns- significant weight changes-  positive or negative  · Change in sleeping patterns- unable to sleep or sleeping all the time   · Unwillingness or inability to communicate  · Depression  · Unusual sadness, discouragement and loneliness  · Talk of wanting to die  · Neglect of personal appearance   · Rebelliousness- reckless behavior  · Withdrawal  from people/activities they love  · Confusion- inability to concentrate     If you or a loved one observes any of these behaviors or has concerns about self-harm, here's what you can do:  · Talk about it- your feelings and reasons for harming yourself  · Remove any means that you might use to hurt yourself (examples: pills, rope, extension cords, firearm)  · Get professional help from the community (Mental Health, Substance Abuse, psychological counseling)  · Do not be alone:Call your Safe Contact- someone whom you trust who will be there for you.  · Call your local CRISIS HOTLINE 024-1509 or 021-709-9649  · Call your local Children's Mobile Crisis Response Team [place] (622) 897-1499 or www.Critique^It  · Call the toll free National Suicide Prevention Hotlines   · National Suicide Prevention Lifeline 103-385-VLZQ (3992)  · National Hope Line Network 800-SUICIDE (662-4267)

## 2022-02-25 NOTE — PROGRESS NOTES
Pt A/Ox4. RA. Reported no pain at this time. LLQ colostomy with lqquid stools. No SOB identified. Pt appeared to be in no distress. No questions or concerns at this time. POC discussed with pt. Call light within reach and all needs are met at this time. High fall risk precautions in place. Bed alarm on. Fall education given.

## 2022-02-25 NOTE — PROCEDURES
Vascular Access Team     Date of Insertion: 2/25/2022  Arm Circumference: 21  Internal length: 37  External Length: 0  Vein Occupancy %: 41   Reason for PICC: Chemo  Labs: WBC 7.6, , BUN 20, Cr 0.79, GFR >60, INR n/a     Consents confirmed, vessel patency confirmed with ultrasound. Risks and benefits of procedure explained to patient and education regarding central line associated bloodstream infections provided. Questions answered.      PICC placed in LUE per licensed provider order with ultrasound guidance.  4 Fr, 1 lumen PICC placed in Brachial vein after 1 attempt(s). 2 mL of 1% lidocaine injected intradermally at the insertion site. A 21 gauge microintroducer needle was visualized entering the vein and modified Seldinger technique was used to obtain access to the vein. 37 cm catheter inserted and brisk blood return was observed from each lumen upon aspiration. Line secured at the 0 cm marker. TCS stylet removed and observed to be fully intact. Each lumen flushed using pulsatile method without resistance with 10 mL 0.9% normal saline. PICC line secured with Biopatch and Tegaderm.     PICC tip placement location is confirmed by nurse to be in the Superior Vena Cava (SVC) utilizing Xray. PICC line is appropriate for use at this time. Patient tolerated procedure well, without complications.  Patient condition relayed to primary RN or ordering physician via this post procedure note in the EMR.      Ultrasound images uploaded to PACS and viewable in the EMR - yes  Ultrasound imaged printed and placed in paper chart - no     BARD Power PICC ref # [id number], Lot # [id number], Expiration Date 10/31/2022

## 2022-02-25 NOTE — DISCHARGE PLANNING
Meds-to-Beds: Discharge prescription orders listed below delivered to patient's bedside. RN notified. Patient counseled. Patient elected to have co-payment billed to patient account.      30 tablets of sodium bicarbonate dispensed, patient will get remainder OTC.     Current Outpatient Medications   Medication Sig Dispense Refill   • rivaroxaban (XARELTO) 20 MG Tab tablet Take 1 Tablet by mouth with dinner. 30 Tablet 0   • loperamide (IMODIUM) 2 MG Cap Take 1 Capsule by mouth 2 times a day. 30 Capsule 2   • sodium bicarbonate (SODIUM BICARBONATE) 650 MG Tab Take 2 Tablets by mouth in the morning, at noon, and at bedtime. 30 Tablet 3   • [START ON 2/26/2022] thiamine (THIAMINE) 100 MG tablet Take 1 Tablet by mouth every day. 30 Tablet 0      Giulia Ryan, PharmD

## 2022-02-25 NOTE — PROGRESS NOTES
Confirmed PICC line placement with VAT RN. Okay to use. Pt discharged in stable condition. DC orders reviewed with patient and family. All belongings sent with patient.

## 2022-02-25 NOTE — PROGRESS NOTES
GYN/Oncology Progress Note               Author: Nicky ROTHMAN Date & Time created: 2/24/2022  11:44 PM     Interval History:  Patient doing well. No using oxygen and denies SOB and generally feeing better.Would like to go home soon.     Review of Systems:  Review of Systems   Constitutional: Negative for chills and fever.   Respiratory: Negative for cough and shortness of breath.    Cardiovascular: Negative for chest pain and leg swelling.   Gastrointestinal: Positive for diarrhea. Negative for abdominal pain, nausea and vomiting.   Genitourinary: Negative for dysuria, frequency and urgency.       Physical Exam:  Physical Exam  Constitutional:       Appearance: Normal appearance.   Pulmonary:      Effort: Pulmonary effort is normal.   Abdominal:      Palpations: Abdomen is soft.      Comments: Left ostomy with liquid stool    Musculoskeletal:         General: No swelling.   Skin:     General: Skin is warm and dry.      Capillary Refill: Capillary refill takes 2 to 3 seconds.   Neurological:      Mental Status: She is alert and oriented to person, place, and time.         Labs:  Recent Labs     02/22/22  1438   AOVAW02N 7.33*   [id number] 25.6*   [id number] 206.4*   MFPW6WTX 99.3*   ARTHCO3 13*   ARTBE -11*         Recent Labs     02/22/22  1146 02/22/22  1735 02/23/22  0158 02/24/22  0148   SODIUM 132*  --  133* 133*   POTASSIUM 5.0  --  4.6 4.2   CHLORIDE 108  --  109 106   CO2 13*  --  15* 17*   BUN 25*  --  21 20   CREATININE 1.46*  --  0.89 0.80   MAGNESIUM 1.7  --  1.5 2.4   PHOSPHORUS  --  3.2 3.1 2.5   CALCIUM 9.9  --  9.3 8.7     Recent Labs     02/22/22  1146 02/22/22  1438 02/23/22  0158 02/24/22  0148   ALTSGPT 5  --  <5 6   ASTSGOT 11*  --  9* 9*   ALKPHOSPHAT 59  --  49 47   TBILIRUBIN 0.3  --  0.2 <0.2   PREALBUMIN  --  27.9  --   --    GLUCOSE 117*  --  100* 101*     Recent Labs     02/22/22  1146 02/23/22  0158 02/24/22  0148   RBC 3.63* 3.18* 3.02*   HEMOGLOBIN 10.8* 9.3* 9.1*   HEMATOCRIT  33.5* 29.0* 27.5*   PLATELETCT 469* 359 390   FERRITIN  --  135.0  --      Recent Labs     22  1146 22  0158 22  0148   WBC 12.8* 9.4 8.0   NEUTSPOLYS 75.90*  --  57.40   LYMPHOCYTES 12.90*  --  29.60   MONOCYTES 10.50  --  12.30   EOSINOPHILS 0.00  --  0.10   BASOPHILS 0.20  --  0.20   ASTSGOT 11* 9* 9*   ALTSGPT 5 <5 6   ALKPHOSPHAT 59 49 47   TBILIRUBIN 0.3 0.2 <0.2     Recent Labs     22  1146 22  0158 22  0148   SODIUM 132* 133* 133*   POTASSIUM 5.0 4.6 4.2   CHLORIDE 108 109 106   CO2 13* 15* 17*   GLUCOSE 117* 100* 101*   BUN 25* 21 20   CREATININE 1.46* 0.89 0.80   CALCIUM 9.9 9.3 8.7     Hemodynamics:  Temp (24hrs), Av.9 °C (98.4 °F), Min:36.6 °C (97.8 °F), Max:37.3 °C (99.1 °F)  Temperature: 36.9 °C (98.4 °F)  Pulse  Av.4  Min: 76  Max: 110   Blood Pressure : 130/71     Respiratory:    Respiration: 16, Pulse Oximetry: 94 %     Work Of Breathing / Effort: Mild  RUL Breath Sounds: Clear, RML Breath Sounds: Clear, RLL Breath Sounds: Diminished, KENNY Breath Sounds: Clear, LLL Breath Sounds: Diminished  Fluids:    Intake/Output Summary (Last 24 hours) at 2022 1446  Last data filed at 2022 0500  Gross per 24 hour   Intake --   Output 300 ml   Net -300 ml     Weight: 36.3 kg (80 lb 0.4 oz)  GI/Nutrition:  Orders Placed This Encounter   Procedures   • Diet Order Diet: Regular     Standing Status:   Standing     Number of Occurrences:   1     Order Specific Question:   Diet:     Answer:   Regular [1]     Medical Decision Making, by Problem:  Active Hospital Problems    Diagnosis    • *Acute respiratory failure with hypoxia (HCC) [J96.01]    • AP (abdominal pain) [R10.9]    • Malnutrition (HCC) [E46]    • Elevated troponin [R77.8]    • Acute kidney injury superimposed on chronic kidney disease (HCC) [N17.9, N18.9]    • Metabolic acidosis with normal anion gap and bicarbonate losses [E87.2]    • Ovarian ca (HCC) [C56.9]    • Pulmonary embolism (HCC) [I26.99]         Plan:  This is a 82 y.o.female w/ stage IIIC HGSOC, status post optimal cytoreductive surgery, admitted with worsening SOB/TORREZ and hypoxia:      1. SOB: concern for underlying cardiopulmonary process given significant exacerbation since recent d/c from the hospital last week w/ same. CTA on 2/22 with right middle lobe segmental PE; on Xeralto. Echo on 2/23 with EF of 60% and normal diastolic dysfunction. Currently asymptomatic. Appreciate IM recs.   2. Hypoxia: improved; now on room air. O2 supplementation as needed and monitor demand  3. Dehydration: patient has ileostomy w/ poor appetite, IVF resuscitation. On imodium.   4. H/o PE: continue xarelto.   5. Leukocytosis: afebrile with no clinical signs of infection, suspect d/t malignancy vs. Hemoconcentration d/t dehydration. WBC 8.0 today.   6. CKD: baseline Cr ~ 1, most recently 1.4 on 2/21, improved with IVF   7. Ovarian cancer: s/p cytoreductive surgery, due to begin chemotherapy, plan to initiate inpatient if neg cardiopulm w/u  8. Poor venous access: will need PICC line placement    Case discussed with Dr. Ignacio  Quality-Core Measures

## 2022-02-25 NOTE — CARE PLAN
The patient is Stable - Low risk of patient condition declining or worsening    Shift Goals  Clinical Goals: PICC placement, colostomy care  Patient Goals: Comfort, rest, mobilize  Family Goals: N/A    Progress made toward(s) clinical / shift goals: See notes below      Problem: Knowledge Deficit - Standard  Goal: Patient and family/care givers will demonstrate understanding of plan of care, disease process/condition, diagnostic tests and medications  Outcome: Progressing  Notes: POC and medications discussed with pt     Problem: Fall Risk  Goal: Patient will remain free from falls  Outcome: Progressing  Notes: High fall risk precautions in place. Bed alarm on. Fall education given.     Problem: Skin Care - Ostomy  Goal: Skin remains free from irritation  Outcome: Progressing  Notes: Ostomy care performed and bag checked/emptied

## 2022-02-27 LAB
BACTERIA BLD CULT: NORMAL
BACTERIA BLD CULT: NORMAL
SIGNIFICANT IND 70042: NORMAL
SIGNIFICANT IND 70042: NORMAL
SITE SITE: NORMAL
SITE SITE: NORMAL
SOURCE SOURCE: NORMAL
SOURCE SOURCE: NORMAL

## 2022-02-28 LAB — A1AT SERPL-MCNC: 184 MG/DL (ref 90–200)

## 2022-03-22 ENCOUNTER — HOSPITAL ENCOUNTER (EMERGENCY)
Facility: MEDICAL CENTER | Age: 82
End: 2022-03-22
Attending: EMERGENCY MEDICINE
Payer: MEDICARE

## 2022-03-22 VITALS
WEIGHT: 80 LBS | HEART RATE: 87 BPM | OXYGEN SATURATION: 98 % | BODY MASS INDEX: 14.72 KG/M2 | TEMPERATURE: 98.4 F | SYSTOLIC BLOOD PRESSURE: 106 MMHG | HEIGHT: 62 IN | DIASTOLIC BLOOD PRESSURE: 59 MMHG | RESPIRATION RATE: 16 BRPM

## 2022-03-22 DIAGNOSIS — E83.42 HYPOMAGNESEMIA: ICD-10-CM

## 2022-03-22 DIAGNOSIS — E86.0 DEHYDRATION: ICD-10-CM

## 2022-03-22 LAB
ALBUMIN SERPL BCP-MCNC: 3.8 G/DL (ref 3.2–4.9)
ALBUMIN/GLOB SERPL: 1.3 G/DL
ALP SERPL-CCNC: 64 U/L (ref 30–99)
ALT SERPL-CCNC: 10 U/L (ref 2–50)
ANION GAP SERPL CALC-SCNC: 14 MMOL/L (ref 7–16)
AST SERPL-CCNC: 16 U/L (ref 12–45)
BASOPHILS # BLD AUTO: 0.1 % (ref 0–1.8)
BASOPHILS # BLD: 0.01 K/UL (ref 0–0.12)
BILIRUB SERPL-MCNC: <0.2 MG/DL (ref 0.1–1.5)
BUN SERPL-MCNC: 44 MG/DL (ref 8–22)
CALCIUM SERPL-MCNC: 9 MG/DL (ref 8.5–10.5)
CHLORIDE SERPL-SCNC: 103 MMOL/L (ref 96–112)
CO2 SERPL-SCNC: 20 MMOL/L (ref 20–33)
CREAT SERPL-MCNC: 1.8 MG/DL (ref 0.5–1.4)
EOSINOPHIL # BLD AUTO: 0 K/UL (ref 0–0.51)
EOSINOPHIL NFR BLD: 0 % (ref 0–6.9)
ERYTHROCYTE [DISTWIDTH] IN BLOOD BY AUTOMATED COUNT: 51.4 FL (ref 35.9–50)
GFR SERPLBLD CREATININE-BSD FMLA CKD-EPI: 28 ML/MIN/1.73 M 2
GLOBULIN SER CALC-MCNC: 2.9 G/DL (ref 1.9–3.5)
GLUCOSE SERPL-MCNC: 125 MG/DL (ref 65–99)
HCT VFR BLD AUTO: 27.4 % (ref 37–47)
HGB BLD-MCNC: 8.9 G/DL (ref 12–16)
IMM GRANULOCYTES # BLD AUTO: 0.07 K/UL (ref 0–0.11)
IMM GRANULOCYTES NFR BLD AUTO: 1 % (ref 0–0.9)
LYMPHOCYTES # BLD AUTO: 2.21 K/UL (ref 1–4.8)
LYMPHOCYTES NFR BLD: 30.4 % (ref 22–41)
MAGNESIUM SERPL-MCNC: 1.4 MG/DL (ref 1.5–2.5)
MCH RBC QN AUTO: 29.7 PG (ref 27–33)
MCHC RBC AUTO-ENTMCNC: 32.5 G/DL (ref 33.6–35)
MCV RBC AUTO: 91.3 FL (ref 81.4–97.8)
MONOCYTES # BLD AUTO: 0.74 K/UL (ref 0–0.85)
MONOCYTES NFR BLD AUTO: 10.2 % (ref 0–13.4)
NEUTROPHILS # BLD AUTO: 4.25 K/UL (ref 2–7.15)
NEUTROPHILS NFR BLD: 58.3 % (ref 44–72)
NRBC # BLD AUTO: 0 K/UL
NRBC BLD-RTO: 0 /100 WBC
PLATELET # BLD AUTO: 429 K/UL (ref 164–446)
PMV BLD AUTO: 8.3 FL (ref 9–12.9)
POTASSIUM SERPL-SCNC: 4.9 MMOL/L (ref 3.6–5.5)
PROT SERPL-MCNC: 6.7 G/DL (ref 6–8.2)
RBC # BLD AUTO: 3 M/UL (ref 4.2–5.4)
SODIUM SERPL-SCNC: 137 MMOL/L (ref 135–145)
WBC # BLD AUTO: 7.3 K/UL (ref 4.8–10.8)

## 2022-03-22 PROCEDURE — 96361 HYDRATE IV INFUSION ADD-ON: CPT

## 2022-03-22 PROCEDURE — 96365 THER/PROPH/DIAG IV INF INIT: CPT

## 2022-03-22 PROCEDURE — 700105 HCHG RX REV CODE 258: Performed by: EMERGENCY MEDICINE

## 2022-03-22 PROCEDURE — 700111 HCHG RX REV CODE 636 W/ 250 OVERRIDE (IP): Performed by: EMERGENCY MEDICINE

## 2022-03-22 PROCEDURE — 85025 COMPLETE CBC W/AUTO DIFF WBC: CPT

## 2022-03-22 PROCEDURE — 99284 EMERGENCY DEPT VISIT MOD MDM: CPT

## 2022-03-22 PROCEDURE — 80053 COMPREHEN METABOLIC PANEL: CPT

## 2022-03-22 PROCEDURE — 36415 COLL VENOUS BLD VENIPUNCTURE: CPT

## 2022-03-22 PROCEDURE — 83735 ASSAY OF MAGNESIUM: CPT

## 2022-03-22 RX ORDER — MAGNESIUM SULFATE HEPTAHYDRATE 40 MG/ML
2 INJECTION, SOLUTION INTRAVENOUS ONCE
Status: COMPLETED | OUTPATIENT
Start: 2022-03-22 | End: 2022-03-22

## 2022-03-22 RX ORDER — SODIUM CHLORIDE 9 MG/ML
1000 INJECTION, SOLUTION INTRAVENOUS ONCE
Status: COMPLETED | OUTPATIENT
Start: 2022-03-22 | End: 2022-03-22

## 2022-03-22 RX ADMIN — SODIUM CHLORIDE 1000 ML: 9 INJECTION, SOLUTION INTRAVENOUS at 19:51

## 2022-03-22 RX ADMIN — MAGNESIUM SULFATE HEPTAHYDRATE 2 G: 40 INJECTION, SOLUTION INTRAVENOUS at 21:18

## 2022-03-22 ASSESSMENT — FIBROSIS 4 INDEX: FIB4 SCORE: 1.83

## 2022-03-22 NOTE — ED TRIAGE NOTES
Vira Carmona  82 y.o. female  Chief Complaint   Patient presents with   • Sent by MD     Sent by oncologist Dr. Maloney for IVF for rehydration prior to chemo treatment tomorrow. Pt reports PO intake improving but appetite still not great, denies vomiting.   • Cancer     Hx ovarian/colon CA. Last chemo approx 3 weeks ago.     Pt presents to triage for above. No distress noted. Pt placed in family room.     Triage process explained to patient. Pt encouraged to notify staff of any change in condition.

## 2022-03-23 NOTE — ED NOTES
Discharge instructions completed with pt and son at bedside, all question answered and follow-up care discussed. Pt ambulatory to lobby on discharge

## 2022-03-23 NOTE — ED PROVIDER NOTES
ED Provider Note    Scribed for Dr. Kyle Witt M.D. by Daphnie Hoffman. 3/22/2022  6:49 PM    Primary care provider: No primary care provider noted  Means of arrival: Walk in  History obtained from: Patient  History limited by: None    CHIEF COMPLAINT  Chief Complaint   Patient presents with   • Sent by MD     Sent by oncologist Dr. Maloney for IVF for rehydration prior to chemo treatment tomorrow. Pt reports PO intake improving but appetite still not great, denies vomiting.   • Cancer     Hx ovarian/colon CA. Last chemo approx 3 weeks ago.       HPI  Vira Carmona is a 82 y.o. female with a history of ovarian and colon cancer who presents to the Emergency Department for evaluation of dehydration. Her last chemotherapy treatment was three weeks ago and her next appointment tomorrow,  referred to the ER for treatment of dehydration and possible hypomagnesemia before chemo tomorrow.  Her oncologist Dr. Maloney referred to the ER for hs advised her to come to the ED for IVF for rehydration. She was previously advised to increase her fiber and Magnesium intake. She also has an ostomy bag in place. She denies vomiting.     REVIEW OF SYSTEMS  Pertinent positives include dehydration. Pertinent negatives include no vomiting. As above, all other systems reviewed and are negative.   See HPI for further details.     PAST MEDICAL HISTORY   None noted    SURGICAL HISTORY  patient denies any surgical history    SOCIAL HISTORY  Social History     Tobacco Use   • Smoking status: Never Smoker   • Smokeless tobacco: Never Used   Vaping Use   • Vaping Use: Never used   Substance Use Topics   • Alcohol use: Never   • Drug use: Never      Social History     Substance and Sexual Activity   Drug Use Never       FAMILY HISTORY  History reviewed. No pertinent family history.    CURRENT MEDICATIONS  Home Medications     Reviewed by Amira Roberson R.N. (Registered Nurse) on 03/22/22 at 1557  Med List Status: Partial   Medication Last  "Dose Status   acetaminophen (TYLENOL) 650 MG CR tablet  Active   CALCIUM PO  Active   Calcium Polycarbophil (FIBER) 625 MG Tab  Active   loperamide (IMODIUM) 2 MG Cap  Active   lovastatin (MEVACOR) 20 MG Tab  Active   metoprolol tartrate (LOPRESSOR) 25 MG Tab  Active   multivitamin (THERAGRAN) Tab  Active   omeprazole (PRILOSEC) 20 MG delayed-release capsule  Active   ondansetron (ZOFRAN ODT) 4 MG TABLET DISPERSIBLE  Active   Psyllium (METAMUCIL) 28 % packet  Active   raloxifene (EVISTA) 60 MG Tab  Active   rivaroxaban (XARELTO) 20 MG Tab tablet  Active   sodium bicarbonate (SODIUM BICARBONATE) 650 MG Tab  Active   thiamine (THIAMINE) 100 MG tablet  Active   vitamin D3 (CHOLECALCIFEROL) 1000 Unit (25 mcg) Tab  Active                ALLERGIES  Allergies   Allergen Reactions   • Ciprofloxacin Rash     Rash       PHYSICAL EXAM  VITAL SIGNS: /71   Pulse (!) 103   Temp 36.9 °C (98.4 °F) (Temporal)   Resp 16   Ht 1.575 m (5' 2\")   Wt 36.3 kg (80 lb)   SpO2 100%   BMI 14.63 kg/m²     Constitutional: Cachetic appearing,    HENT: Dry mouth. Normocephalic, Atraumatic, Bilateral external ears normal, No oral exudates.   Eyes: PERRLA, EOMI, Conjunctiva normal, No discharge.   Neck: No tenderness, Supple, No stridor.   Lymphatic: No lymphadenopathy noted.   Cardiovascular: Normal heart rate, Normal rhythm.   Thorax & Lungs: Clear to auscultation bilaterally, No respiratory distress, No wheezing, No crackles.   Abdomen: Colostomy in the left lower quadrant. Soft, No tenderness, No masses, No pulsatile masses.   Skin: Warm, Dry, No erythema, No rash.   Extremities:, No edema No cyanosis.   Musculoskeletal: No tenderness to palpation or major deformities noted.  Intact distal pulses  Neurologic: Awake, alert. Moves all extremities spontaneously.  Psychiatric: Affect normal, Judgment normal, Mood normal.     LABS  Results for orders placed or performed during the hospital encounter of 03/22/22   CBC WITH DIFFERENTIAL "   Result Value Ref Range    WBC 7.3 4.8 - 10.8 K/uL    RBC 3.00 (L) 4.20 - 5.40 M/uL    Hemoglobin 8.9 (L) 12.0 - 16.0 g/dL    Hematocrit 27.4 (L) 37.0 - 47.0 %    MCV 91.3 81.4 - 97.8 fL    MCH 29.7 27.0 - 33.0 pg    MCHC 32.5 (L) 33.6 - 35.0 g/dL    RDW 51.4 (H) 35.9 - 50.0 fL    Platelet Count 429 164 - 446 K/uL    MPV 8.3 (L) 9.0 - 12.9 fL    Neutrophils-Polys 58.30 44.00 - 72.00 %    Lymphocytes 30.40 22.00 - 41.00 %    Monocytes 10.20 0.00 - 13.40 %    Eosinophils 0.00 0.00 - 6.90 %    Basophils 0.10 0.00 - 1.80 %    Immature Granulocytes 1.00 (H) 0.00 - 0.90 %    Nucleated RBC 0.00 /100 WBC    Neutrophils (Absolute) 4.25 2.00 - 7.15 K/uL    Lymphs (Absolute) 2.21 1.00 - 4.80 K/uL    Monos (Absolute) 0.74 0.00 - 0.85 K/uL    Eos (Absolute) 0.00 0.00 - 0.51 K/uL    Baso (Absolute) 0.01 0.00 - 0.12 K/uL    Immature Granulocytes (abs) 0.07 0.00 - 0.11 K/uL    NRBC (Absolute) 0.00 K/uL   COMP METABOLIC PANEL   Result Value Ref Range    Sodium 137 135 - 145 mmol/L    Potassium 4.9 3.6 - 5.5 mmol/L    Chloride 103 96 - 112 mmol/L    Co2 20 20 - 33 mmol/L    Anion Gap 14.0 7.0 - 16.0    Glucose 125 (H) 65 - 99 mg/dL    Bun 44 (H) 8 - 22 mg/dL    Creatinine 1.80 (H) 0.50 - 1.40 mg/dL    Calcium 9.0 8.5 - 10.5 mg/dL    AST(SGOT) 16 12 - 45 U/L    ALT(SGPT) 10 2 - 50 U/L    Alkaline Phosphatase 64 30 - 99 U/L    Total Bilirubin <0.2 0.1 - 1.5 mg/dL    Albumin 3.8 3.2 - 4.9 g/dL    Total Protein 6.7 6.0 - 8.2 g/dL    Globulin 2.9 1.9 - 3.5 g/dL    A-G Ratio 1.3 g/dL   MAGNESIUM   Result Value Ref Range    Magnesium 1.4 (L) 1.5 - 2.5 mg/dL   ESTIMATED GFR   Result Value Ref Range    GFR (CKD-EPI) 28 (A) >60 mL/min/1.73 m 2      All labs reviewed by me.    COURSE & MEDICAL DECISION MAKING  Pertinent Labs & Imaging studies reviewed. (See chart for details)    6:49 PM - Patient seen and examined at bedside. Patient will be treated with NS Infusion 1,000 mL. Ordered CMP, CBC w/ Diff, and Magnesium to evaluate her symptoms.  The differential diagnoses include but are not limited to:     HYDRATION: Based on the patient's presentation of Dehydration the patient was given IV fluids. IV Hydration was used because oral hydration was not adequate alone. Upon recheck following hydration, the patient was well improved.    Decision Makin-year-old female who appears to be somewhat dehydrated and in with low magnesium will hydrate with IV fluids and replace magnesium.  She already has a plan for treatment tomorrow with chemotherapy and I think that can be continued    The patient will return for new or worsening symptoms and is stable at the time of discharge.    The patient is referred to a primary physician for blood pressure management, diabetic screening, and for all other preventative health concerns.    DISPOSITION:  Patient will be discharged home in stable condition.    FOLLOW UP:  No follow-up provider specified.    OUTPATIENT MEDICATIONS:  New Prescriptions    No medications on file        FINAL IMPRESSION  1. Hypomagnesemia    2. Dehydration          Daphnie SMALL (Ike), am scribing for, and in the presence of, Kyle Witt M.D..    Electronically signed by: Daphnie Hoffman (Ike), 3/22/2022    Kyle SMALL M.D. personally performed the services described in this documentation, as scribed by Daphnie Hoffman in my presence, and it is both accurate and complete. C.     The note accurately reflects work and decisions made by me.  Kyle Witt M.D.  3/22/2022  9:25 PM

## 2022-03-23 NOTE — ED NOTES
Pt resting with infusions running. Fresh warm blanket provided and lights dimmed. No additional needs at this time, call light remains within reach.

## 2022-08-03 NOTE — DISCHARGE SUMMARY
Discharge Summary    CHIEF COMPLAINT ON ADMISSION  Chief Complaint   Patient presents with   • Shortness of Breath     Pt BIB EMS from cancer clinic. Pt's O2 sat in 70's on RA per EMS. Pt placed on 6L O2. Pt A&Ox4.       Reason for Admission  EMS     Admission Date  2/22/2022    CODE STATUS  DNAR/DNI    HPI & HOSPITAL COURSE  82 y.o. female who presented 2/22/2022 with PMH of HTN, CKD, HLD, ovarian cancer, PE, who presented to our facility at the request of her OP oncologist due to worsening O2 demands.     Acute respiratory failure with hypoxia (HCC)- (present on admission)  Resolved  Suspected 2/2 PE. CTPE confirming PE, no signs of R heart strain. Patient reports baseline previously was 3L w/ exertion. Procal and CXR revealing no acute signs of infection. COVID negative.   - Completed BID loading dose on 2/23/22, will transitioned to QD on 2/24/22, will be cont on d/c for at least 3 months    High output ileostomy (HCC)  Patient reporting increasing output in ileostomy over past couple of weeks with need for several changes throughout the day.  - D/c on Metamucil TID and Immodium TID     Ovarian ca (HCC)  - PICC line placed in anticipation of chemotherapy initiation OP as per Gyn-Onc      Metabolic acidosis with normal anion gap and bicarbonate losses  Improved  Suspect metabolic acidosis likely 2/2 diarrhea in setting of increased ostomy output. ABG confirms adequate compensation at this time  - Initiated sodium bicarbonate this admission, patient to continue on d/c    Severe protein-calorie malnutrition (HCC)  Patient noted to have BMI 14.27 and reported h/o lack of appetite for the past several weeks. Suspect 2/2 underlying ovarian/colon CA.  - Will benefit from continued nutrition monitoring OP, nutrition consult placed.        Therefore, she is discharged in good and stable condition to home with close outpatient follow-up.    The patient met 2-midnight criteria for an inpatient stay at the time of  discharge.    Discharge Date  2/25/22    FOLLOW UP ITEMS POST DISCHARGE  Follow-up with Gyn-Onc for initiation of chemotherapy treatment    DISCHARGE DIAGNOSES  Principal Problem:    Acute respiratory failure with hypoxia (HCC) POA: Yes  Active Problems:    AP (abdominal pain) POA: Unknown    Severe protein-calorie malnutrition (HCC) POA: Unknown    Elevated troponin POA: Unknown    Metabolic acidosis with normal anion gap and bicarbonate losses POA: Unknown    Ovarian ca (HCC) POA: Unknown    Pulmonary embolism (HCC) -- subacute  POA: Yes    High output ileostomy (HCC) POA: Unknown  Resolved Problems:    IMELDA (acute kidney injury) (HCC) POA: Unknown      FOLLOW UP  No future appointments.  No follow-up provider specified.    MEDICATIONS ON DISCHARGE     Medication List      START taking these medications      Instructions   Fiber 625 MG Tabs   Take 1 Tablet by mouth 3 times a day with meals.  Dose: 625 mg     loperamide 2 MG Caps  Commonly known as: IMODIUM   Take 1 Capsule by mouth 2 times a day.  Dose: 2 mg     Psyllium 28 % packet  Commonly known as: METAMUCIL   Take 1 Packet by mouth in the morning, at noon, and at bedtime for 30 days.  Dose: 1 Packet     sodium bicarbonate 650 MG Tabs  Commonly known as: SODIUM BICARBONATE   Take 2 Tablets by mouth in the morning, at noon, and at bedtime.  Dose: 1,300 mg     thiamine 100 MG tablet  Start taking on: February 26, 2022  Commonly known as: THIAMINE   Take 1 Tablet by mouth every day.  Dose: 100 mg        CHANGE how you take these medications      Instructions   rivaroxaban 20 MG Tabs tablet  What changed:   · medication strength  · how much to take  · when to take this  Commonly known as: XARELTO   Take 1 Tablet by mouth with dinner.  Dose: 20 mg        CONTINUE taking these medications      Instructions   acetaminophen 650 MG CR tablet  Commonly known as: TYLENOL   Take 1,300 mg by mouth 3 times a day as needed. Indications: Pain  Dose: 1,300 mg     CALCIUM PO    Take 1 Tablet by mouth every day.  Dose: 1 Tablet     lovastatin 20 MG Tabs  Commonly known as: MEVACOR   Take 20 mg by mouth every day.  Dose: 20 mg     metoprolol tartrate 25 MG Tabs  Commonly known as: LOPRESSOR   Take 25 mg by mouth 2 times a day.  Dose: 25 mg     multivitamin Tabs   Take 1 Tablet by mouth every day.  Dose: 1 Tablet     omeprazole 20 MG delayed-release capsule  Commonly known as: PRILOSEC   Take 20 mg by mouth every day.  Dose: 20 mg     ondansetron 4 MG Tbdp  Commonly known as: ZOFRAN ODT   Take 4 mg by mouth every 6 hours as needed for Nausea.  Dose: 4 mg     raloxifene 60 MG Tabs  Commonly known as: EVISTA   Take 60 mg by mouth every day.  Dose: 60 mg     vitamin D3 1000 Unit (25 mcg) Tabs  Commonly known as: cholecalciferol   Take 1,000 Units by mouth every day.  Dose: 1,000 Units        STOP taking these medications    amoxicillin-clavulanate 875-125 MG Tabs  Commonly known as: AUGMENTIN            Allergies  Allergies   Allergen Reactions   • Ciprofloxacin Rash     Rash       DIET  Orders Placed This Encounter   Procedures   • Diet Order Diet: Regular     Standing Status:   Standing     Number of Occurrences:   1     Order Specific Question:   Diet:     Answer:   Regular [1]       ACTIVITY  As tolerated.  Weight bearing as tolerated    CONSULTATIONS  Gyn-Onc    PROCEDURES  None       Review of Systems:    Review of Systems   Constitutional: Negative for chills and fever.   HENT: Negative for hearing loss and sore throat.    Eyes: Negative for blurred vision and double vision.   Respiratory: Negative for cough and shortness of breath.    Cardiovascular: Negative for chest pain and palpitations.   Gastrointestinal: Positive for diarrhea. Negative for constipation.   Musculoskeletal: Negative for back pain and myalgias.   Neurological: Negative for tingling and headaches.     Vitals:    02/25/22 0709   BP: 126/68   Pulse: 95   Resp: 16   Temp: 36.8 °C (98.2 °F)   SpO2: 95%     Physical  Exam  Constitutional:       Comments: Cachetic appearance   HENT:      Head: Normocephalic and atraumatic.      Mouth/Throat:      Mouth: Mucous membranes are moist.      Pharynx: Oropharynx is clear.      Comments: Yellowing noted to lingual frenulum   Eyes:      Pupils: Pupils are equal, round, and reactive to light.   Cardiovascular:      Rate and Rhythm: Normal rate and regular rhythm.      Pulses: Normal pulses.      Heart sounds: Normal heart sounds. No murmur heard.    No friction rub. No gallop.   Pulmonary:      Effort: Pulmonary effort is normal. No respiratory distress.      Breath sounds: Normal breath sounds. No stridor. No wheezing or rhonchi.   Abdominal:      General: Abdomen is flat.      Palpations: Abdomen is soft.      Tenderness: No TTP     Comments: Ileostomy in place w/ liquid brown stool noted. Stoma pink, well perfused   Musculoskeletal:         General: Deformity (RA deviation to PIP joints b/l) present. Normal range of motion.   Skin:     General: Skin is warm and dry.      Capillary Refill: Capillary refill takes less than 2 seconds.    Neurological:      General: No focal deficit present.      Mental Status: She is alert and oriented to person, place, and time.   Psychiatric:         Mood and Affect: Mood normal.         Behavior: Behavior normal.     LABORATORY  Lab Results   Component Value Date    SODIUM 136 02/25/2022    POTASSIUM 4.7 02/25/2022    CHLORIDE 111 02/25/2022    CO2 15 (L) 02/25/2022    GLUCOSE 104 (H) 02/25/2022    BUN 20 02/25/2022    CREATININE 0.79 02/25/2022        Lab Results   Component Value Date    WBC 7.6 02/25/2022    HEMOGLOBIN 9.2 (L) 02/25/2022    HEMATOCRIT 30.7 (L) 02/25/2022    PLATELETCT 363 02/25/2022        Total time of the discharge process exceeds 24 minutes.   Lot # For Kenalog (Optional): [id number] Lot # (Optional): [id number]

## 2022-10-27 ENCOUNTER — HOSPITAL ENCOUNTER (OUTPATIENT)
Facility: MEDICAL CENTER | Age: 82
End: 2022-10-27
Attending: OBSTETRICS & GYNECOLOGY
Payer: MEDICARE

## 2022-10-27 LAB
ALBUMIN SERPL BCP-MCNC: 3.4 G/DL (ref 3.2–4.9)
ALBUMIN/GLOB SERPL: 1.3 G/DL
ALP SERPL-CCNC: 58 U/L (ref 30–99)
ALT SERPL-CCNC: 5 U/L (ref 2–50)
ANION GAP SERPL CALC-SCNC: 12 MMOL/L (ref 7–16)
APTT PPP: 43.2 SEC (ref 24.7–36)
AST SERPL-CCNC: 16 U/L (ref 12–45)
BASOPHILS # BLD AUTO: 0.5 % (ref 0–1.8)
BASOPHILS # BLD: 0.03 K/UL (ref 0–0.12)
BILIRUB SERPL-MCNC: <0.2 MG/DL (ref 0.1–1.5)
BUN SERPL-MCNC: 18 MG/DL (ref 8–22)
CALCIUM SERPL-MCNC: 8.7 MG/DL (ref 8.5–10.5)
CANCER AG125 SERPL-ACNC: 164 U/ML (ref 0–35)
CHLORIDE SERPL-SCNC: 106 MMOL/L (ref 96–112)
CO2 SERPL-SCNC: 21 MMOL/L (ref 20–33)
CREAT SERPL-MCNC: 0.94 MG/DL (ref 0.5–1.4)
EOSINOPHIL # BLD AUTO: 0.14 K/UL (ref 0–0.51)
EOSINOPHIL NFR BLD: 2.5 % (ref 0–6.9)
ERYTHROCYTE [DISTWIDTH] IN BLOOD BY AUTOMATED COUNT: 54.7 FL (ref 35.9–50)
FORWARD REASON: SPWHY: NORMAL
FORWARDED TO LAB: SPWHR: NORMAL
GFR SERPLBLD CREATININE-BSD FMLA CKD-EPI: 60 ML/MIN/1.73 M 2
GLOBULIN SER CALC-MCNC: 2.6 G/DL (ref 1.9–3.5)
GLUCOSE SERPL-MCNC: 135 MG/DL (ref 65–99)
HCT VFR BLD AUTO: 30.3 % (ref 37–47)
HGB BLD-MCNC: 9.5 G/DL (ref 12–16)
IMM GRANULOCYTES # BLD AUTO: 0.01 K/UL (ref 0–0.11)
IMM GRANULOCYTES NFR BLD AUTO: 0.2 % (ref 0–0.9)
INR PPP: 1.31 (ref 0.87–1.13)
LYMPHOCYTES # BLD AUTO: 2.01 K/UL (ref 1–4.8)
LYMPHOCYTES NFR BLD: 35.9 % (ref 22–41)
MAGNESIUM SERPL-MCNC: 1.5 MG/DL (ref 1.5–2.5)
MCH RBC QN AUTO: 31.3 PG (ref 27–33)
MCHC RBC AUTO-ENTMCNC: 31.4 G/DL (ref 33.6–35)
MCV RBC AUTO: 99.7 FL (ref 81.4–97.8)
MONOCYTES # BLD AUTO: 0.8 K/UL (ref 0–0.85)
MONOCYTES NFR BLD AUTO: 14.3 % (ref 0–13.4)
NEUTROPHILS # BLD AUTO: 2.61 K/UL (ref 2–7.15)
NEUTROPHILS NFR BLD: 46.6 % (ref 44–72)
NRBC # BLD AUTO: 0 K/UL
NRBC BLD-RTO: 0 /100 WBC
PHOSPHATE SERPL-MCNC: 4.4 MG/DL (ref 2.5–4.5)
PLATELET # BLD AUTO: 316 K/UL (ref 164–446)
PMV BLD AUTO: 9 FL (ref 9–12.9)
POTASSIUM SERPL-SCNC: 4.4 MMOL/L (ref 3.6–5.5)
PROT SERPL-MCNC: 6 G/DL (ref 6–8.2)
PROTHROMBIN TIME: 16 SEC (ref 12–14.6)
RBC # BLD AUTO: 3.04 M/UL (ref 4.2–5.4)
SODIUM SERPL-SCNC: 139 MMOL/L (ref 135–145)
SPECIMEN SENT (2ND): SPWT2: NORMAL
SPECIMEN SENT (3RD): SPWT3: NORMAL
SPECIMEN SENT (4TH): SPWT4: NORMAL
SPECIMEN SENT: SPWT1: NORMAL
WBC # BLD AUTO: 5.6 K/UL (ref 4.8–10.8)

## 2022-10-27 PROCEDURE — 84100 ASSAY OF PHOSPHORUS: CPT

## 2022-10-27 PROCEDURE — 85610 PROTHROMBIN TIME: CPT

## 2022-10-27 PROCEDURE — 86304 IMMUNOASSAY TUMOR CA 125: CPT

## 2022-10-27 PROCEDURE — 85025 COMPLETE CBC W/AUTO DIFF WBC: CPT

## 2022-10-27 PROCEDURE — 80053 COMPREHEN METABOLIC PANEL: CPT

## 2022-10-27 PROCEDURE — 83735 ASSAY OF MAGNESIUM: CPT

## 2022-10-27 PROCEDURE — 85730 THROMBOPLASTIN TIME PARTIAL: CPT

## 2023-07-21 NOTE — PROGRESS NOTES
Bedside report received from day shift RN. Assumed care at 1900. Pt is A&Ox4. Pt is in bed. Patient is on RA. Pt was updated on plan of care. Pt has call light, personal belongings, and bedside table within reach. Bed locked and in lowest position.    Tylenol and Motrin for pain. Alternate them every 3-4 hours for best results. Do not submerge underwater for 24 hours. Keep dressing on for 12 to 24 hours then clean twice daily with antibacterial hand soap and water, dry, apply over-the-counter bacitracin ointment and cover with a nonadherent Band-Aid or bandage. Follow-up with [facility] for [employer]. Call them in the morning for discussion and appointment time. Sutures need to be removed in 10 days in this emergency department or with your primary care doctor or with [facility]. Signs of infection or increased pain, redness, warmth or pus from the wound. If this occurs return to the emergency department.